# Patient Record
Sex: FEMALE | Employment: OTHER | URBAN - METROPOLITAN AREA
[De-identification: names, ages, dates, MRNs, and addresses within clinical notes are randomized per-mention and may not be internally consistent; named-entity substitution may affect disease eponyms.]

---

## 2024-05-02 ENCOUNTER — HOSPITAL ENCOUNTER (INPATIENT)
Age: 79
LOS: 6 days | Discharge: HOME | DRG: 871 | End: 2024-05-08
Attending: STUDENT IN AN ORGANIZED HEALTH CARE EDUCATION/TRAINING PROGRAM | Admitting: STUDENT IN AN ORGANIZED HEALTH CARE EDUCATION/TRAINING PROGRAM
Payer: MEDICARE

## 2024-05-02 ENCOUNTER — APPOINTMENT (OUTPATIENT)
Dept: CT IMAGING | Age: 79
DRG: 871 | End: 2024-05-02
Payer: MEDICARE

## 2024-05-02 DIAGNOSIS — I71.40 ABDOMINAL AORTIC ANEURYSM (AAA) WITHOUT RUPTURE, UNSPECIFIED PART (HCC): ICD-10-CM

## 2024-05-02 DIAGNOSIS — N17.9 AKI (ACUTE KIDNEY INJURY) (HCC): ICD-10-CM

## 2024-05-02 DIAGNOSIS — J96.01 SEPSIS WITH ACUTE HYPOXIC RESPIRATORY FAILURE WITHOUT SEPTIC SHOCK, DUE TO UNSPECIFIED ORGANISM (HCC): Primary | ICD-10-CM

## 2024-05-02 DIAGNOSIS — A41.9 SEPSIS WITH ACUTE HYPOXIC RESPIRATORY FAILURE WITHOUT SEPTIC SHOCK, DUE TO UNSPECIFIED ORGANISM (HCC): Primary | ICD-10-CM

## 2024-05-02 DIAGNOSIS — I48.0 PAROXYSMAL ATRIAL FIBRILLATION (HCC): ICD-10-CM

## 2024-05-02 DIAGNOSIS — R65.20 SEPSIS WITH ACUTE HYPOXIC RESPIRATORY FAILURE WITHOUT SEPTIC SHOCK, DUE TO UNSPECIFIED ORGANISM (HCC): Primary | ICD-10-CM

## 2024-05-02 PROBLEM — J96.00 ACUTE RESPIRATORY FAILURE (HCC): Status: ACTIVE | Noted: 2024-05-02

## 2024-05-02 LAB
ABO/RH: NORMAL
ALBUMIN SERPL-MCNC: 3.6 GM/DL (ref 3.4–5)
ALP BLD-CCNC: 105 IU/L (ref 40–128)
ALT SERPL-CCNC: 6 U/L (ref 10–40)
ANION GAP SERPL CALCULATED.3IONS-SCNC: 17 MMOL/L (ref 7–16)
ANTIBODY SCREEN: NEGATIVE
APTT: 30.8 SECONDS (ref 25.1–37.1)
AST SERPL-CCNC: 20 IU/L (ref 15–37)
B PARAP IS1001 DNA NPH QL NAA+NON-PROBE: NOT DETECTED
B PERT.PT PRMT NPH QL NAA+NON-PROBE: NOT DETECTED
BASE EXCESS MIXED: 1.9 (ref 0–3)
BASOPHILS ABSOLUTE: 0 K/CU MM
BASOPHILS RELATIVE PERCENT: 0.1 % (ref 0–1)
BILIRUB SERPL-MCNC: 0.6 MG/DL (ref 0–1)
BILIRUBIN, URINE: NEGATIVE MG/DL
BLOOD, URINE: ABNORMAL
BUN SERPL-MCNC: 49 MG/DL (ref 6–23)
C PNEUM DNA NPH QL NAA+NON-PROBE: NOT DETECTED
CALCIUM SERPL-MCNC: 8.8 MG/DL (ref 8.3–10.6)
CHLORIDE BLD-SCNC: 92 MMOL/L (ref 99–110)
CHLORIDE URINE RANDOM: 12 MMOL/L (ref 43–210)
CLARITY: ABNORMAL
CO2: 22 MMOL/L (ref 21–32)
COLOR: YELLOW
CREAT SERPL-MCNC: 3.9 MG/DL (ref 0.6–1.1)
CREATININE URINE: 135.9 MG/DL (ref 28–217)
DIFFERENTIAL TYPE: ABNORMAL
EOSINOPHILS ABSOLUTE: 0 K/CU MM
EOSINOPHILS RELATIVE PERCENT: 0 % (ref 0–3)
FLUAV H1 2009 PAN RNA NPH NAA+NON-PROBE: NOT DETECTED
FLUAV H1 RNA NPH QL NAA+NON-PROBE: NOT DETECTED
FLUAV H3 RNA NPH QL NAA+NON-PROBE: NOT DETECTED
FLUAV RNA NPH QL NAA+NON-PROBE: NOT DETECTED
FLUBV RNA NPH QL NAA+NON-PROBE: NOT DETECTED
GFR, ESTIMATED: 11 ML/MIN/1.73M2
GLUCOSE SERPL-MCNC: 96 MG/DL (ref 70–99)
GLUCOSE URINE: NEGATIVE MG/DL
HADV DNA NPH QL NAA+NON-PROBE: NOT DETECTED
HCO3 VENOUS: 29 MMOL/L (ref 22–29)
HCOV 229E RNA NPH QL NAA+NON-PROBE: NOT DETECTED
HCOV HKU1 RNA NPH QL NAA+NON-PROBE: NOT DETECTED
HCOV NL63 RNA NPH QL NAA+NON-PROBE: NOT DETECTED
HCOV OC43 RNA NPH QL NAA+NON-PROBE: NOT DETECTED
HCT VFR BLD CALC: 37.5 % (ref 37–47)
HEMOGLOBIN: 11.4 GM/DL (ref 12.5–16)
HMPV RNA NPH QL NAA+NON-PROBE: NOT DETECTED
HPIV1 RNA NPH QL NAA+NON-PROBE: NOT DETECTED
HPIV2 RNA NPH QL NAA+NON-PROBE: NOT DETECTED
HPIV3 RNA NPH QL NAA+NON-PROBE: NOT DETECTED
HPIV4 RNA NPH QL NAA+NON-PROBE: NOT DETECTED
IMMATURE NEUTROPHIL %: 0.3 % (ref 0–0.43)
INR BLD: 1.5 INDEX
KETONES, URINE: NEGATIVE MG/DL
LACTIC ACID, SEPSIS: 1.2 MMOL/L (ref 0.4–2)
LACTIC ACID, SEPSIS: 2.1 MMOL/L (ref 0.4–2)
LEUKOCYTE ESTERASE, URINE: ABNORMAL
LYMPHOCYTES ABSOLUTE: 0.4 K/CU MM
LYMPHOCYTES RELATIVE PERCENT: 2.9 % (ref 24–44)
M PNEUMO DNA NPH QL NAA+NON-PROBE: NOT DETECTED
MCH RBC QN AUTO: 26.6 PG (ref 27–31)
MCHC RBC AUTO-ENTMCNC: 30.4 % (ref 32–36)
MCV RBC AUTO: 87.6 FL (ref 78–100)
MONOCYTES ABSOLUTE: 0.7 K/CU MM
MONOCYTES RELATIVE PERCENT: 4.6 % (ref 0–4)
NEUTROPHILS ABSOLUTE: 13.6 K/CU MM
NEUTROPHILS RELATIVE PERCENT: 92.1 % (ref 36–66)
NITRITE URINE, QUANTITATIVE: NEGATIVE
NUCLEATED RBC %: 0 %
O2 SAT, VEN: 75.6 % (ref 50–70)
PCO2, VEN: 55 MMHG (ref 41–51)
PDW BLD-RTO: 14.7 % (ref 11.7–14.9)
PH VENOUS: 7.33 (ref 7.32–7.43)
PH, URINE: 5 (ref 5–8)
PLATELET # BLD: 238 K/CU MM (ref 140–440)
PMV BLD AUTO: 10.5 FL (ref 7.5–11.1)
PO2, VEN: 45 MMHG (ref 28–48)
POTASSIUM SERPL-SCNC: 4.2 MMOL/L (ref 3.5–5.1)
POTASSIUM, UR: 31.2 MMOL/L (ref 22–119)
PRO-BNP: 7496 PG/ML
PROCALCITONIN SERPL-MCNC: 14.14 NG/ML
PROT/CREAT RATIO, UR: 0.6
PROTEIN UA: 30 MG/DL
PROTHROMBIN TIME: 18.2 SECONDS (ref 11.7–14.5)
RBC # BLD: 4.28 M/CU MM (ref 4.2–5.4)
RSV RNA NPH QL NAA+NON-PROBE: NOT DETECTED
RV+EV RNA NPH QL NAA+NON-PROBE: NOT DETECTED
SARS-COV-2 RNA NPH QL NAA+NON-PROBE: NOT DETECTED
SODIUM BLD-SCNC: 131 MMOL/L (ref 135–145)
SODIUM URINE: 18 MMOL/L (ref 35–167)
SODIUM URINE: 18 MMOL/L (ref 35–167)
SPECIFIC GRAVITY UA: 1.02 (ref 1–1.03)
TOTAL CK: 210 IU/L (ref 26–140)
TOTAL IMMATURE NEUTOROPHIL: 0.05 K/CU MM
TOTAL NUCLEATED RBC: 0 K/CU MM
TOTAL PROTEIN: 7 GM/DL (ref 6.4–8.2)
TROPONIN, HIGH SENSITIVITY: 145 NG/L (ref 0–14)
URINE TOTAL PROTEIN: 86.8 MG/DL
UROBILINOGEN, URINE: 1 MG/DL (ref 0.2–1)
WBC # BLD: 14.8 K/CU MM (ref 4–10.5)

## 2024-05-02 PROCEDURE — 85610 PROTHROMBIN TIME: CPT

## 2024-05-02 PROCEDURE — 86900 BLOOD TYPING SEROLOGIC ABO: CPT

## 2024-05-02 PROCEDURE — 84145 PROCALCITONIN (PCT): CPT

## 2024-05-02 PROCEDURE — 82550 ASSAY OF CK (CPK): CPT

## 2024-05-02 PROCEDURE — 83605 ASSAY OF LACTIC ACID: CPT

## 2024-05-02 PROCEDURE — 82570 ASSAY OF URINE CREATININE: CPT

## 2024-05-02 PROCEDURE — 74174 CTA ABD&PLVS W/CONTRAST: CPT

## 2024-05-02 PROCEDURE — 86850 RBC ANTIBODY SCREEN: CPT

## 2024-05-02 PROCEDURE — 2500000003 HC RX 250 WO HCPCS: Performed by: STUDENT IN AN ORGANIZED HEALTH CARE EDUCATION/TRAINING PROGRAM

## 2024-05-02 PROCEDURE — 85730 THROMBOPLASTIN TIME PARTIAL: CPT

## 2024-05-02 PROCEDURE — 6360000002 HC RX W HCPCS: Performed by: STUDENT IN AN ORGANIZED HEALTH CARE EDUCATION/TRAINING PROGRAM

## 2024-05-02 PROCEDURE — 83880 ASSAY OF NATRIURETIC PEPTIDE: CPT

## 2024-05-02 PROCEDURE — 6370000000 HC RX 637 (ALT 250 FOR IP): Performed by: STUDENT IN AN ORGANIZED HEALTH CARE EDUCATION/TRAINING PROGRAM

## 2024-05-02 PROCEDURE — 2140000000 HC CCU INTERMEDIATE R&B

## 2024-05-02 PROCEDURE — 99285 EMERGENCY DEPT VISIT HI MDM: CPT

## 2024-05-02 PROCEDURE — 6360000004 HC RX CONTRAST MEDICATION: Performed by: STUDENT IN AN ORGANIZED HEALTH CARE EDUCATION/TRAINING PROGRAM

## 2024-05-02 PROCEDURE — 94640 AIRWAY INHALATION TREATMENT: CPT

## 2024-05-02 PROCEDURE — 81001 URINALYSIS AUTO W/SCOPE: CPT

## 2024-05-02 PROCEDURE — 2700000000 HC OXYGEN THERAPY PER DAY

## 2024-05-02 PROCEDURE — 87205 SMEAR GRAM STAIN: CPT

## 2024-05-02 PROCEDURE — 84156 ASSAY OF PROTEIN URINE: CPT

## 2024-05-02 PROCEDURE — 87449 NOS EACH ORGANISM AG IA: CPT

## 2024-05-02 PROCEDURE — 85025 COMPLETE CBC W/AUTO DIFF WBC: CPT

## 2024-05-02 PROCEDURE — 86901 BLOOD TYPING SEROLOGIC RH(D): CPT

## 2024-05-02 PROCEDURE — 2580000003 HC RX 258: Performed by: STUDENT IN AN ORGANIZED HEALTH CARE EDUCATION/TRAINING PROGRAM

## 2024-05-02 PROCEDURE — 84484 ASSAY OF TROPONIN QUANT: CPT

## 2024-05-02 PROCEDURE — 96361 HYDRATE IV INFUSION ADD-ON: CPT

## 2024-05-02 PROCEDURE — 96368 THER/DIAG CONCURRENT INF: CPT

## 2024-05-02 PROCEDURE — 87185 SC STD ENZYME DETCJ PER NZM: CPT

## 2024-05-02 PROCEDURE — 87070 CULTURE OTHR SPECIMN AEROBIC: CPT

## 2024-05-02 PROCEDURE — 80053 COMPREHEN METABOLIC PANEL: CPT

## 2024-05-02 PROCEDURE — 87077 CULTURE AEROBIC IDENTIFY: CPT

## 2024-05-02 PROCEDURE — 96365 THER/PROPH/DIAG IV INF INIT: CPT

## 2024-05-02 PROCEDURE — 0202U NFCT DS 22 TRGT SARS-COV-2: CPT

## 2024-05-02 PROCEDURE — 84133 ASSAY OF URINE POTASSIUM: CPT

## 2024-05-02 PROCEDURE — 96375 TX/PRO/DX INJ NEW DRUG ADDON: CPT

## 2024-05-02 PROCEDURE — 87040 BLOOD CULTURE FOR BACTERIA: CPT

## 2024-05-02 PROCEDURE — 84300 ASSAY OF URINE SODIUM: CPT

## 2024-05-02 PROCEDURE — 87899 AGENT NOS ASSAY W/OPTIC: CPT

## 2024-05-02 PROCEDURE — 82805 BLOOD GASES W/O2 SATURATION: CPT

## 2024-05-02 PROCEDURE — 82436 ASSAY OF URINE CHLORIDE: CPT

## 2024-05-02 RX ORDER — 0.9 % SODIUM CHLORIDE 0.9 %
1000 INTRAVENOUS SOLUTION INTRAVENOUS ONCE
Status: DISCONTINUED | OUTPATIENT
Start: 2024-05-02 | End: 2024-05-02

## 2024-05-02 RX ORDER — DOXYCYCLINE HYCLATE 100 MG
100 TABLET ORAL EVERY 12 HOURS SCHEDULED
Status: COMPLETED | OUTPATIENT
Start: 2024-05-03 | End: 2024-05-06

## 2024-05-02 RX ORDER — TRAZODONE HYDROCHLORIDE 50 MG/1
50 TABLET ORAL NIGHTLY
Status: ON HOLD | COMMUNITY
End: 2024-05-03 | Stop reason: CLARIF

## 2024-05-02 RX ORDER — IPRATROPIUM BROMIDE AND ALBUTEROL SULFATE 2.5; .5 MG/3ML; MG/3ML
2 SOLUTION RESPIRATORY (INHALATION) ONCE
Status: COMPLETED | OUTPATIENT
Start: 2024-05-02 | End: 2024-05-02

## 2024-05-02 RX ORDER — IPRATROPIUM BROMIDE AND ALBUTEROL SULFATE 2.5; .5 MG/3ML; MG/3ML
1 SOLUTION RESPIRATORY (INHALATION)
Status: DISCONTINUED | OUTPATIENT
Start: 2024-05-03 | End: 2024-05-05

## 2024-05-02 RX ORDER — ACETAMINOPHEN 650 MG/1
650 SUPPOSITORY RECTAL EVERY 6 HOURS PRN
Status: DISCONTINUED | OUTPATIENT
Start: 2024-05-02 | End: 2024-05-08 | Stop reason: HOSPADM

## 2024-05-02 RX ORDER — FENTANYL CITRATE 50 UG/ML
25 INJECTION, SOLUTION INTRAMUSCULAR; INTRAVENOUS ONCE
Status: COMPLETED | OUTPATIENT
Start: 2024-05-02 | End: 2024-05-02

## 2024-05-02 RX ORDER — ACETAMINOPHEN 325 MG/1
650 TABLET ORAL EVERY 6 HOURS PRN
Status: DISCONTINUED | OUTPATIENT
Start: 2024-05-02 | End: 2024-05-08 | Stop reason: HOSPADM

## 2024-05-02 RX ORDER — 0.9 % SODIUM CHLORIDE 0.9 %
1298 INTRAVENOUS SOLUTION INTRAVENOUS ONCE
Status: COMPLETED | OUTPATIENT
Start: 2024-05-02 | End: 2024-05-02

## 2024-05-02 RX ORDER — 0.9 % SODIUM CHLORIDE 0.9 %
1000 INTRAVENOUS SOLUTION INTRAVENOUS ONCE
Status: COMPLETED | OUTPATIENT
Start: 2024-05-02 | End: 2024-05-02

## 2024-05-02 RX ADMIN — SODIUM CHLORIDE 1000 ML: 9 INJECTION, SOLUTION INTRAVENOUS at 18:52

## 2024-05-02 RX ADMIN — PREDNISONE 50 MG: 20 TABLET ORAL at 20:35

## 2024-05-02 RX ADMIN — PIPERACILLIN AND TAZOBACTAM 4500 MG: 4; .5 INJECTION, POWDER, FOR SOLUTION INTRAVENOUS at 20:29

## 2024-05-02 RX ADMIN — IOPAMIDOL 75 ML: 755 INJECTION, SOLUTION INTRAVENOUS at 19:20

## 2024-05-02 RX ADMIN — FENTANYL CITRATE 25 MCG: 50 INJECTION, SOLUTION INTRAMUSCULAR; INTRAVENOUS at 21:14

## 2024-05-02 RX ADMIN — SODIUM CHLORIDE 1298 ML: 9 INJECTION, SOLUTION INTRAVENOUS at 20:19

## 2024-05-02 RX ADMIN — DOXYCYCLINE 100 MG: 100 INJECTION, POWDER, LYOPHILIZED, FOR SOLUTION INTRAVENOUS at 20:21

## 2024-05-02 RX ADMIN — IPRATROPIUM BROMIDE AND ALBUTEROL SULFATE 2 DOSE: .5; 3 SOLUTION RESPIRATORY (INHALATION) at 19:33

## 2024-05-02 ASSESSMENT — LIFESTYLE VARIABLES
HOW MANY STANDARD DRINKS CONTAINING ALCOHOL DO YOU HAVE ON A TYPICAL DAY: PATIENT DOES NOT DRINK
HOW OFTEN DO YOU HAVE A DRINK CONTAINING ALCOHOL: NEVER

## 2024-05-02 ASSESSMENT — PAIN SCALES - GENERAL: PAINLEVEL_OUTOF10: 9

## 2024-05-02 NOTE — ED PROVIDER NOTES
Emergency Department Encounter        Pt Name: Mamta Cobian  MRN: [id number]  Birthdate 1945  Date of evaluation: 5/2/2024  ED Physician: Carlos Ford MD    CHIEF COMPLAINT     Triage Chief Complaint:   Fatigue and Weight Loss (Loss of appetite)      HISTORY OF PRESENT ILLNESS & REVIEW OF SYSTEMS     History obtained from the patient and staff and family member at bedside.    Mamta Cobian is a 78 y.o. female who presents to the emergency department for evaluation of fatigue.  Patient is a poor historian.  Has a history of lung cancer and is supposed to wear 2 L supplemental oxygen at night, family members that she has been wearing it almost all day.  Says that says that she is having some lung pain on the right.  Denies any known sick contacts.  Denies any fevers.  Denies any cough.  Says she is on Eliquis and compliant.  Says she has a history of AAA that was repaired in 2014 without complication.  Says she has history of lung cancer.  Denies any abdominal pain.  Denies any blood in her stool or urine or when she is brushing her teeth.  Says that she is feeling tired and overall generally weak.  Says that she has been intaking fluids but has not been eating much.  Says that she has not started any chemo or radiation or any treatment for lung cancer yet.        Patient denies any new Headache, Fever, Chills, Cough, Shortness of breath, Abdominal pain, Nausea, Vomiting, Diarrhea, Constipation, and Leg swelling.    The patient has no other acute complaints at this time.  Review of systems as above.          PAST MED/SURG/SOCIAL/FAM HISTORY & ALLERGY & MEDICATIONS   No past medical history on file.  Patient Active Problem List   Diagnosis Code    Acute respiratory failure (HCC) J96.00     No family history on file.    Current Facility-Administered Medications:     [START ON 5/3/2024] cefTRIAXone (ROCEPHIN) 2,000 mg in sodium chloride 0.9 % 50 mL IVPB (mini-bag), 2,000 mg, IntraVENous, Q24H, Vane William

## 2024-05-03 LAB
ANION GAP SERPL CALCULATED.3IONS-SCNC: 14 MMOL/L (ref 7–16)
BACTERIA: NEGATIVE /HPF
BASOPHILS ABSOLUTE: 0 K/CU MM
BASOPHILS RELATIVE PERCENT: 0.2 % (ref 0–1)
BUN SERPL-MCNC: 49 MG/DL (ref 6–23)
CALCIUM SERPL-MCNC: 7.6 MG/DL (ref 8.3–10.6)
CHLORIDE BLD-SCNC: 97 MMOL/L (ref 99–110)
CO2: 21 MMOL/L (ref 21–32)
CREAT SERPL-MCNC: 4 MG/DL (ref 0.6–1.1)
DIFFERENTIAL TYPE: ABNORMAL
EOSINOPHILS ABSOLUTE: 0 K/CU MM
EOSINOPHILS RELATIVE PERCENT: 0 % (ref 0–3)
GFR, ESTIMATED: 11 ML/MIN/1.73M2
GLUCOSE SERPL-MCNC: 118 MG/DL (ref 70–99)
GRANULAR CASTS: 2 /LPF
HCT VFR BLD CALC: 33.6 % (ref 37–47)
HEMOGLOBIN: 9.8 GM/DL (ref 12.5–16)
HYALINE CASTS: 2 /LPF
IMMATURE NEUTROPHIL %: 0.4 % (ref 0–0.43)
L PNEUMO AG UR QL IA: NEGATIVE
LYMPHOCYTES ABSOLUTE: 0.2 K/CU MM
LYMPHOCYTES RELATIVE PERCENT: 2.1 % (ref 24–44)
MCH RBC QN AUTO: 26.4 PG (ref 27–31)
MCHC RBC AUTO-ENTMCNC: 29.2 % (ref 32–36)
MCV RBC AUTO: 90.6 FL (ref 78–100)
MONOCYTES ABSOLUTE: 0.2 K/CU MM
MONOCYTES RELATIVE PERCENT: 2.2 % (ref 0–4)
MUCUS: ABNORMAL HPF
NEUTROPHILS ABSOLUTE: 9.9 K/CU MM
NEUTROPHILS RELATIVE PERCENT: 95.1 % (ref 36–66)
NUCLEATED RBC %: 0 %
PDW BLD-RTO: 14.7 % (ref 11.7–14.9)
PLATELET # BLD: 181 K/CU MM (ref 140–440)
PMV BLD AUTO: 10.1 FL (ref 7.5–11.1)
POTASSIUM SERPL-SCNC: 4.9 MMOL/L (ref 3.5–5.1)
RBC # BLD: 3.71 M/CU MM (ref 4.2–5.4)
RBC CASTS: 7 /LPF
RBC URINE: 0 /HPF (ref 0–6)
S PNEUM AG CSF QL: NORMAL
SODIUM BLD-SCNC: 132 MMOL/L (ref 135–145)
SQUAMOUS EPITHELIAL: 2 /HPF
TOTAL IMMATURE NEUTOROPHIL: 0.04 K/CU MM
TOTAL NUCLEATED RBC: 0 K/CU MM
TRICHOMONAS: ABNORMAL /HPF
WBC # BLD: 10.4 K/CU MM (ref 4–10.5)
WBC UA: 9 /HPF (ref 0–5)

## 2024-05-03 PROCEDURE — 6370000000 HC RX 637 (ALT 250 FOR IP): Performed by: FAMILY MEDICINE

## 2024-05-03 PROCEDURE — 2700000000 HC OXYGEN THERAPY PER DAY

## 2024-05-03 PROCEDURE — 6370000000 HC RX 637 (ALT 250 FOR IP): Performed by: STUDENT IN AN ORGANIZED HEALTH CARE EDUCATION/TRAINING PROGRAM

## 2024-05-03 PROCEDURE — 94640 AIRWAY INHALATION TREATMENT: CPT

## 2024-05-03 PROCEDURE — 6360000002 HC RX W HCPCS: Performed by: STUDENT IN AN ORGANIZED HEALTH CARE EDUCATION/TRAINING PROGRAM

## 2024-05-03 PROCEDURE — 97535 SELF CARE MNGMENT TRAINING: CPT

## 2024-05-03 PROCEDURE — 36415 COLL VENOUS BLD VENIPUNCTURE: CPT

## 2024-05-03 PROCEDURE — 85025 COMPLETE CBC W/AUTO DIFF WBC: CPT

## 2024-05-03 PROCEDURE — 99222 1ST HOSP IP/OBS MODERATE 55: CPT | Performed by: THORACIC SURGERY (CARDIOTHORACIC VASCULAR SURGERY)

## 2024-05-03 PROCEDURE — 83516 IMMUNOASSAY NONANTIBODY: CPT

## 2024-05-03 PROCEDURE — 2140000000 HC CCU INTERMEDIATE R&B

## 2024-05-03 PROCEDURE — 97116 GAIT TRAINING THERAPY: CPT

## 2024-05-03 PROCEDURE — 94761 N-INVAS EAR/PLS OXIMETRY MLT: CPT

## 2024-05-03 PROCEDURE — 97162 PT EVAL MOD COMPLEX 30 MIN: CPT

## 2024-05-03 PROCEDURE — 80048 BASIC METABOLIC PNL TOTAL CA: CPT

## 2024-05-03 PROCEDURE — 97166 OT EVAL MOD COMPLEX 45 MIN: CPT

## 2024-05-03 PROCEDURE — 2580000003 HC RX 258: Performed by: STUDENT IN AN ORGANIZED HEALTH CARE EDUCATION/TRAINING PROGRAM

## 2024-05-03 PROCEDURE — 86160 COMPLEMENT ANTIGEN: CPT

## 2024-05-03 RX ORDER — TRAZODONE HYDROCHLORIDE 50 MG/1
150 TABLET ORAL NIGHTLY PRN
Status: DISCONTINUED | OUTPATIENT
Start: 2024-05-03 | End: 2024-05-08 | Stop reason: HOSPADM

## 2024-05-03 RX ORDER — TRAZODONE HYDROCHLORIDE 150 MG/1
150 TABLET ORAL NIGHTLY
COMMUNITY

## 2024-05-03 RX ORDER — ASPIRIN 81 MG/1
81 TABLET, CHEWABLE ORAL DAILY
Status: DISCONTINUED | OUTPATIENT
Start: 2024-05-03 | End: 2024-05-08 | Stop reason: HOSPADM

## 2024-05-03 RX ORDER — FLUTICASONE FUROATE, UMECLIDINIUM BROMIDE AND VILANTEROL TRIFENATATE 100; 62.5; 25 UG/1; UG/1; UG/1
1 POWDER RESPIRATORY (INHALATION) DAILY
COMMUNITY

## 2024-05-03 RX ORDER — ALBUTEROL SULFATE 90 UG/1
2 AEROSOL, METERED RESPIRATORY (INHALATION) EVERY 4 HOURS PRN
COMMUNITY

## 2024-05-03 RX ORDER — CITALOPRAM 20 MG/1
20 TABLET ORAL DAILY
Status: DISCONTINUED | OUTPATIENT
Start: 2024-05-03 | End: 2024-05-03

## 2024-05-03 RX ORDER — PREDNISONE 20 MG/1
40 TABLET ORAL DAILY
Status: COMPLETED | OUTPATIENT
Start: 2024-05-03 | End: 2024-05-07

## 2024-05-03 RX ORDER — ROSUVASTATIN CALCIUM 20 MG/1
20 TABLET, COATED ORAL NIGHTLY
Status: DISCONTINUED | OUTPATIENT
Start: 2024-05-03 | End: 2024-05-08 | Stop reason: HOSPADM

## 2024-05-03 RX ORDER — LISINOPRIL 20 MG/1
20 TABLET ORAL DAILY
Status: DISCONTINUED | OUTPATIENT
Start: 2024-05-03 | End: 2024-05-04

## 2024-05-03 RX ORDER — PANTOPRAZOLE SODIUM 40 MG/1
40 TABLET, DELAYED RELEASE ORAL DAILY
COMMUNITY

## 2024-05-03 RX ORDER — ROSUVASTATIN CALCIUM 20 MG/1
20 TABLET, COATED ORAL DAILY
COMMUNITY

## 2024-05-03 RX ORDER — OXYCODONE HYDROCHLORIDE 5 MG/1
5 TABLET ORAL EVERY 6 HOURS PRN
Status: DISCONTINUED | OUTPATIENT
Start: 2024-05-03 | End: 2024-05-03

## 2024-05-03 RX ORDER — PANTOPRAZOLE SODIUM 40 MG/1
40 TABLET, DELAYED RELEASE ORAL DAILY
Status: DISCONTINUED | OUTPATIENT
Start: 2024-05-03 | End: 2024-05-08 | Stop reason: HOSPADM

## 2024-05-03 RX ORDER — TIZANIDINE 4 MG/1
4 TABLET ORAL AS NEEDED
COMMUNITY

## 2024-05-03 RX ORDER — OXYCODONE HYDROCHLORIDE 5 MG/1
5 TABLET ORAL EVERY 4 HOURS PRN
Status: DISCONTINUED | OUTPATIENT
Start: 2024-05-03 | End: 2024-05-05

## 2024-05-03 RX ORDER — ROSUVASTATIN CALCIUM 20 MG/1
20 TABLET, COATED ORAL DAILY
Status: DISCONTINUED | OUTPATIENT
Start: 2024-05-03 | End: 2024-05-03

## 2024-05-03 RX ORDER — IPRATROPIUM BROMIDE AND ALBUTEROL SULFATE 2.5; .5 MG/3ML; MG/3ML
3 SOLUTION RESPIRATORY (INHALATION) EVERY 4 HOURS
COMMUNITY

## 2024-05-03 RX ORDER — OXYCODONE HYDROCHLORIDE 5 MG/1
5 TABLET ORAL EVERY 4 HOURS PRN
COMMUNITY

## 2024-05-03 RX ORDER — LISINOPRIL 20 MG/1
20 TABLET ORAL DAILY
Status: ON HOLD | COMMUNITY
End: 2024-05-07 | Stop reason: HOSPADM

## 2024-05-03 RX ORDER — CITALOPRAM 20 MG/1
20 TABLET ORAL NIGHTLY
Status: DISCONTINUED | OUTPATIENT
Start: 2024-05-03 | End: 2024-05-08 | Stop reason: HOSPADM

## 2024-05-03 RX ORDER — ASPIRIN 81 MG/1
81 TABLET, CHEWABLE ORAL DAILY
COMMUNITY

## 2024-05-03 RX ORDER — TIZANIDINE 2 MG/1
4 TABLET ORAL
Status: DISCONTINUED | OUTPATIENT
Start: 2024-05-03 | End: 2024-05-08 | Stop reason: HOSPADM

## 2024-05-03 RX ORDER — CITALOPRAM 20 MG/1
20 TABLET ORAL DAILY
COMMUNITY

## 2024-05-03 RX ADMIN — OXYCODONE HYDROCHLORIDE 5 MG: 5 TABLET ORAL at 05:45

## 2024-05-03 RX ADMIN — PREDNISONE 40 MG: 20 TABLET ORAL at 09:23

## 2024-05-03 RX ADMIN — CEFTRIAXONE SODIUM 2000 MG: 2 INJECTION, POWDER, FOR SOLUTION INTRAMUSCULAR; INTRAVENOUS at 10:15

## 2024-05-03 RX ADMIN — METOPROLOL TARTRATE 25 MG: 25 TABLET, FILM COATED ORAL at 20:52

## 2024-05-03 RX ADMIN — APIXABAN 5 MG: 5 TABLET, FILM COATED ORAL at 05:46

## 2024-05-03 RX ADMIN — OXYCODONE HYDROCHLORIDE 5 MG: 5 TABLET ORAL at 18:15

## 2024-05-03 RX ADMIN — DOXYCYCLINE HYCLATE 100 MG: 100 TABLET, COATED ORAL at 09:29

## 2024-05-03 RX ADMIN — OXYCODONE HYDROCHLORIDE 5 MG: 5 TABLET ORAL at 09:23

## 2024-05-03 RX ADMIN — OXYCODONE HYDROCHLORIDE 5 MG: 5 TABLET ORAL at 00:59

## 2024-05-03 RX ADMIN — IPRATROPIUM BROMIDE AND ALBUTEROL SULFATE 1 DOSE: 2.5; .5 SOLUTION RESPIRATORY (INHALATION) at 11:09

## 2024-05-03 RX ADMIN — METOPROLOL TARTRATE 25 MG: 25 TABLET, FILM COATED ORAL at 09:23

## 2024-05-03 RX ADMIN — ROSUVASTATIN CALCIUM 20 MG: 20 TABLET, COATED ORAL at 20:53

## 2024-05-03 RX ADMIN — IPRATROPIUM BROMIDE AND ALBUTEROL SULFATE 1 DOSE: 2.5; .5 SOLUTION RESPIRATORY (INHALATION) at 14:23

## 2024-05-03 RX ADMIN — CITALOPRAM HYDROBROMIDE 20 MG: 20 TABLET ORAL at 01:00

## 2024-05-03 RX ADMIN — ASPIRIN 81 MG: 81 TABLET, CHEWABLE ORAL at 09:23

## 2024-05-03 RX ADMIN — APIXABAN 5 MG: 5 TABLET, FILM COATED ORAL at 18:15

## 2024-05-03 RX ADMIN — IPRATROPIUM BROMIDE AND ALBUTEROL SULFATE 1 DOSE: 2.5; .5 SOLUTION RESPIRATORY (INHALATION) at 20:15

## 2024-05-03 RX ADMIN — PANTOPRAZOLE SODIUM 40 MG: 40 TABLET, DELAYED RELEASE ORAL at 05:46

## 2024-05-03 RX ADMIN — CITALOPRAM HYDROBROMIDE 20 MG: 20 TABLET ORAL at 20:52

## 2024-05-03 RX ADMIN — IPRATROPIUM BROMIDE AND ALBUTEROL SULFATE 1 DOSE: 2.5; .5 SOLUTION RESPIRATORY (INHALATION) at 07:30

## 2024-05-03 RX ADMIN — ROSUVASTATIN CALCIUM 20 MG: 20 TABLET, COATED ORAL at 01:00

## 2024-05-03 RX ADMIN — DOXYCYCLINE HYCLATE 100 MG: 100 TABLET, COATED ORAL at 20:53

## 2024-05-03 ASSESSMENT — PAIN DESCRIPTION - DIRECTION
RADIATING_TOWARDS: LOCALIZED
RADIATING_TOWARDS: LOCALIZED

## 2024-05-03 ASSESSMENT — PAIN DESCRIPTION - DESCRIPTORS
DESCRIPTORS: ACHING;DISCOMFORT;SORE
DESCRIPTORS: ACHING
DESCRIPTORS: ACHING;JABBING
DESCRIPTORS: DISCOMFORT;SORE;ACHING
DESCRIPTORS: ACHING

## 2024-05-03 ASSESSMENT — PAIN DESCRIPTION - PAIN TYPE
TYPE: CHRONIC PAIN

## 2024-05-03 ASSESSMENT — PAIN SCALES - GENERAL
PAINLEVEL_OUTOF10: 10
PAINLEVEL_OUTOF10: 10
PAINLEVEL_OUTOF10: 9
PAINLEVEL_OUTOF10: 8
PAINLEVEL_OUTOF10: 9

## 2024-05-03 ASSESSMENT — PAIN DESCRIPTION - ORIENTATION
ORIENTATION: LOWER

## 2024-05-03 ASSESSMENT — PAIN DESCRIPTION - ONSET
ONSET: ON-GOING
ONSET: ON-GOING

## 2024-05-03 ASSESSMENT — PAIN - FUNCTIONAL ASSESSMENT
PAIN_FUNCTIONAL_ASSESSMENT: ACTIVITIES ARE NOT PREVENTED
PAIN_FUNCTIONAL_ASSESSMENT: PREVENTS OR INTERFERES SOME ACTIVE ACTIVITIES AND ADLS

## 2024-05-03 ASSESSMENT — PAIN DESCRIPTION - FREQUENCY
FREQUENCY: INTERMITTENT
FREQUENCY: INTERMITTENT

## 2024-05-03 ASSESSMENT — PAIN DESCRIPTION - LOCATION
LOCATION: BACK

## 2024-05-03 NOTE — ED NOTES
ED TO INPATIENT SBAR HANDOFF    Patient Name: Mamta Cobian   :  1945  78 y.o.   Preferred Name    Family/Caregiver Present no   Restraints no   C-SSRS: Risk of Suicide: No Risk  Sitter no   Sepsis Risk Score Sepsis Risk Score: 1.02      Situation  Chief Complaint   Patient presents with    Fatigue    Weight Loss     Loss of appetite     Brief Description of Patient's Condition: pt arrives to ED w/ c/o fatigue and wt loss. Pt has dx of lung cancer. CT revealed pneumonia and pt has increased respiratory issues. Pt recently moved in w/ daughter to assist w/ caring for pt. Pt has been hypotensive despite fluid boluses. Pt normally wears 2LNC @ HS, but requires 4LNC to maintain spO2 >90%. Pt is pleasant and cooperative but sometimes confused. Continually removes purewick/leads despite pt education. Pt \"forgets\" that leads are needed for monitoring but easily redirected to replace them. Pt has no c/o att. Daughter would like updates on Plan of Care/admitting status  Mental Status: oriented, alert, coherent, and able to concentrate and follow conversation  Arrived from: home    Imaging:   CTA CHEST ABDOMEN PELVIS W WO CONTRAST   Final Result      CHEST:      1. Extensive multifocal airspace disease in the lungs most pronounced in the right upper lobe and right lower lobe with bronchial wall thickening and mucous plugging in the airways. Findings are most consistent with multifocal pneumonia.   2. Anterior right upper lobe 2 cm pulmonary nodule suspicious for lung cancer. Follow-up chest CT after acute phase in one month is recommended. Alternatively further evaluation could be performed with bronchoscopy or PET/CT.   3. Right hilar lymphadenopathy which may be reactive. Metastatic lymphadenopathy is not excluded.   4. Large penetrating ulcer or pseudoaneurysm along the lateral aspect of the aortic arch.   5. Descending thoracic aortic aneurysm status post endograft repair. Excluded aneurysm measures 5.6 cm.   6.

## 2024-05-03 NOTE — CARE COORDINATION
05/03/24 1243   Service Assessment   Patient Orientation Alert and Oriented   Cognition Alert   History Provided By Patient   Primary Caregiver Self   Support Systems Children;Family Members   Patient's Healthcare Decision Maker is: Legal Next of Kin   PCP Verified by CM Yes   Prior Functional Level Assistance with the following:;Mobility   Current Functional Level Assistance with the following:;Mobility   Can patient return to prior living arrangement Yes   Ability to make needs known: Good   Family able to assist with home care needs: Yes   Would you like for me to discuss the discharge plan with any other family members/significant others, and if so, who? No   Financial Resources Medicare   Community Resources None     CM in to see Pt to initiate discharge planning. Pt from home with family.  Pt son present. Pt states she has all needed DME.  Pt denies any needs at this time.  CM following

## 2024-05-03 NOTE — H&P
Electronically signed by Nikhil Tate MD        Electronically signed by Vane William MD on 5/3/2024 at 3:12 AM

## 2024-05-03 NOTE — ED NOTES
Pt's sister is at bedside but has to leave. Requests phone call to know if placement will be here in [facility] and what room, or if pt will be transferred to another hospital.

## 2024-05-04 LAB
ALBUMIN SERPL-MCNC: 3.2 GM/DL (ref 3.4–5)
ALP BLD-CCNC: 105 IU/L (ref 40–128)
ALT SERPL-CCNC: 7 U/L (ref 10–40)
ANION GAP SERPL CALCULATED.3IONS-SCNC: 17 MMOL/L (ref 7–16)
AST SERPL-CCNC: 30 IU/L (ref 15–37)
BASOPHILS ABSOLUTE: 0 K/CU MM
BASOPHILS RELATIVE PERCENT: 0.2 % (ref 0–1)
BILIRUB SERPL-MCNC: 0.2 MG/DL (ref 0–1)
BUN SERPL-MCNC: 55 MG/DL (ref 6–23)
CALCIUM SERPL-MCNC: 8.1 MG/DL (ref 8.3–10.6)
CHLORIDE BLD-SCNC: 100 MMOL/L (ref 99–110)
CO2: 18 MMOL/L (ref 21–32)
CREAT SERPL-MCNC: 3.9 MG/DL (ref 0.6–1.1)
CULTURE: ABNORMAL
CULTURE: ABNORMAL
DIFFERENTIAL TYPE: ABNORMAL
EOSINOPHILS ABSOLUTE: 0 K/CU MM
EOSINOPHILS RELATIVE PERCENT: 0 % (ref 0–3)
GFR, ESTIMATED: 11 ML/MIN/1.73M2
GLUCOSE SERPL-MCNC: 112 MG/DL (ref 70–99)
GRAM SMEAR: ABNORMAL
HCT VFR BLD CALC: 34.1 % (ref 37–47)
HEMOGLOBIN: 10.4 GM/DL (ref 12.5–16)
IMMATURE NEUTROPHIL %: 0.4 % (ref 0–0.43)
LYMPHOCYTES ABSOLUTE: 0.4 K/CU MM
LYMPHOCYTES RELATIVE PERCENT: 3.3 % (ref 24–44)
Lab: ABNORMAL
MAGNESIUM: 2.2 MG/DL (ref 1.8–2.4)
MCH RBC QN AUTO: 26.8 PG (ref 27–31)
MCHC RBC AUTO-ENTMCNC: 30.5 % (ref 32–36)
MCV RBC AUTO: 87.9 FL (ref 78–100)
MONOCYTES ABSOLUTE: 0.5 K/CU MM
MONOCYTES RELATIVE PERCENT: 3.8 % (ref 0–4)
NEUTROPHILS ABSOLUTE: 11 K/CU MM
NEUTROPHILS RELATIVE PERCENT: 92.3 % (ref 36–66)
NUCLEATED RBC %: 0 %
PDW BLD-RTO: 14.6 % (ref 11.7–14.9)
PHOSPHORUS: 5.1 MG/DL (ref 2.5–4.9)
PLATELET # BLD: 193 K/CU MM (ref 140–440)
PMV BLD AUTO: 9.8 FL (ref 7.5–11.1)
POTASSIUM SERPL-SCNC: 4.7 MMOL/L (ref 3.5–5.1)
RBC # BLD: 3.88 M/CU MM (ref 4.2–5.4)
SODIUM BLD-SCNC: 135 MMOL/L (ref 135–145)
SPECIMEN: ABNORMAL
TOTAL IMMATURE NEUTOROPHIL: 0.05 K/CU MM
TOTAL NUCLEATED RBC: 0 K/CU MM
TOTAL PROTEIN: 5.7 GM/DL (ref 6.4–8.2)
WBC # BLD: 11.9 K/CU MM (ref 4–10.5)

## 2024-05-04 PROCEDURE — 94640 AIRWAY INHALATION TREATMENT: CPT

## 2024-05-04 PROCEDURE — 6360000002 HC RX W HCPCS: Performed by: STUDENT IN AN ORGANIZED HEALTH CARE EDUCATION/TRAINING PROGRAM

## 2024-05-04 PROCEDURE — 80053 COMPREHEN METABOLIC PANEL: CPT

## 2024-05-04 PROCEDURE — 94664 DEMO&/EVAL PT USE INHALER: CPT

## 2024-05-04 PROCEDURE — 2580000003 HC RX 258: Performed by: STUDENT IN AN ORGANIZED HEALTH CARE EDUCATION/TRAINING PROGRAM

## 2024-05-04 PROCEDURE — 36415 COLL VENOUS BLD VENIPUNCTURE: CPT

## 2024-05-04 PROCEDURE — 2700000000 HC OXYGEN THERAPY PER DAY

## 2024-05-04 PROCEDURE — 85025 COMPLETE CBC W/AUTO DIFF WBC: CPT

## 2024-05-04 PROCEDURE — 94761 N-INVAS EAR/PLS OXIMETRY MLT: CPT

## 2024-05-04 PROCEDURE — 84100 ASSAY OF PHOSPHORUS: CPT

## 2024-05-04 PROCEDURE — 2580000003 HC RX 258: Performed by: INTERNAL MEDICINE

## 2024-05-04 PROCEDURE — 2140000000 HC CCU INTERMEDIATE R&B

## 2024-05-04 PROCEDURE — 6370000000 HC RX 637 (ALT 250 FOR IP): Performed by: FAMILY MEDICINE

## 2024-05-04 PROCEDURE — 83735 ASSAY OF MAGNESIUM: CPT

## 2024-05-04 PROCEDURE — 6370000000 HC RX 637 (ALT 250 FOR IP): Performed by: STUDENT IN AN ORGANIZED HEALTH CARE EDUCATION/TRAINING PROGRAM

## 2024-05-04 RX ORDER — POLYETHYLENE GLYCOL 3350 17 G/17G
17 POWDER, FOR SOLUTION ORAL DAILY
Status: DISCONTINUED | OUTPATIENT
Start: 2024-05-05 | End: 2024-05-08 | Stop reason: HOSPADM

## 2024-05-04 RX ORDER — SENNA AND DOCUSATE SODIUM 50; 8.6 MG/1; MG/1
2 TABLET, FILM COATED ORAL DAILY PRN
Status: DISCONTINUED | OUTPATIENT
Start: 2024-05-04 | End: 2024-05-08 | Stop reason: HOSPADM

## 2024-05-04 RX ORDER — SODIUM CHLORIDE 9 MG/ML
INJECTION, SOLUTION INTRAVENOUS CONTINUOUS
Status: DISCONTINUED | OUTPATIENT
Start: 2024-05-04 | End: 2024-05-06

## 2024-05-04 RX ADMIN — ASPIRIN 81 MG: 81 TABLET, CHEWABLE ORAL at 07:54

## 2024-05-04 RX ADMIN — METOPROLOL TARTRATE 25 MG: 25 TABLET, FILM COATED ORAL at 07:54

## 2024-05-04 RX ADMIN — PREDNISONE 40 MG: 20 TABLET ORAL at 07:54

## 2024-05-04 RX ADMIN — IPRATROPIUM BROMIDE AND ALBUTEROL SULFATE 1 DOSE: 2.5; .5 SOLUTION RESPIRATORY (INHALATION) at 08:41

## 2024-05-04 RX ADMIN — DOXYCYCLINE HYCLATE 100 MG: 100 TABLET, COATED ORAL at 07:54

## 2024-05-04 RX ADMIN — APIXABAN 5 MG: 5 TABLET, FILM COATED ORAL at 04:57

## 2024-05-04 RX ADMIN — ROSUVASTATIN CALCIUM 20 MG: 20 TABLET, COATED ORAL at 21:17

## 2024-05-04 RX ADMIN — SODIUM CHLORIDE: 9 INJECTION, SOLUTION INTRAVENOUS at 11:29

## 2024-05-04 RX ADMIN — PANTOPRAZOLE SODIUM 40 MG: 40 TABLET, DELAYED RELEASE ORAL at 04:57

## 2024-05-04 RX ADMIN — IPRATROPIUM BROMIDE AND ALBUTEROL SULFATE 1 DOSE: 2.5; .5 SOLUTION RESPIRATORY (INHALATION) at 15:30

## 2024-05-04 RX ADMIN — CITALOPRAM HYDROBROMIDE 20 MG: 20 TABLET ORAL at 21:15

## 2024-05-04 RX ADMIN — OXYCODONE HYDROCHLORIDE 5 MG: 5 TABLET ORAL at 16:51

## 2024-05-04 RX ADMIN — IPRATROPIUM BROMIDE AND ALBUTEROL SULFATE 1 DOSE: 2.5; .5 SOLUTION RESPIRATORY (INHALATION) at 20:40

## 2024-05-04 RX ADMIN — OXYCODONE HYDROCHLORIDE 5 MG: 5 TABLET ORAL at 21:15

## 2024-05-04 RX ADMIN — OXYCODONE HYDROCHLORIDE 5 MG: 5 TABLET ORAL at 04:57

## 2024-05-04 RX ADMIN — IPRATROPIUM BROMIDE AND ALBUTEROL SULFATE 1 DOSE: 2.5; .5 SOLUTION RESPIRATORY (INHALATION) at 12:09

## 2024-05-04 RX ADMIN — APIXABAN 5 MG: 5 TABLET, FILM COATED ORAL at 16:51

## 2024-05-04 RX ADMIN — OXYCODONE HYDROCHLORIDE 5 MG: 5 TABLET ORAL at 00:41

## 2024-05-04 RX ADMIN — METOPROLOL TARTRATE 25 MG: 25 TABLET, FILM COATED ORAL at 21:17

## 2024-05-04 RX ADMIN — DOXYCYCLINE HYCLATE 100 MG: 100 TABLET, COATED ORAL at 21:17

## 2024-05-04 RX ADMIN — OXYCODONE HYDROCHLORIDE 5 MG: 5 TABLET ORAL at 09:29

## 2024-05-04 RX ADMIN — CEFTRIAXONE SODIUM 2000 MG: 2 INJECTION, POWDER, FOR SOLUTION INTRAMUSCULAR; INTRAVENOUS at 07:54

## 2024-05-04 ASSESSMENT — PAIN - FUNCTIONAL ASSESSMENT
PAIN_FUNCTIONAL_ASSESSMENT: ACTIVITIES ARE NOT PREVENTED

## 2024-05-04 ASSESSMENT — PAIN DESCRIPTION - DESCRIPTORS
DESCRIPTORS: ACHING

## 2024-05-04 ASSESSMENT — PAIN DESCRIPTION - PAIN TYPE: TYPE: CHRONIC PAIN

## 2024-05-04 ASSESSMENT — PAIN DESCRIPTION - LOCATION
LOCATION: BACK

## 2024-05-04 ASSESSMENT — PAIN DESCRIPTION - ORIENTATION
ORIENTATION: LOWER
ORIENTATION: MID
ORIENTATION: LOWER
ORIENTATION: LOWER

## 2024-05-04 ASSESSMENT — PAIN SCALES - GENERAL
PAINLEVEL_OUTOF10: 9
PAINLEVEL_OUTOF10: 2
PAINLEVEL_OUTOF10: 9
PAINLEVEL_OUTOF10: 9
PAINLEVEL_OUTOF10: 10
PAINLEVEL_OUTOF10: 7
PAINLEVEL_OUTOF10: 9

## 2024-05-04 NOTE — PLAN OF CARE
Problem: Discharge Planning  Goal: Discharge to home or other facility with appropriate resources  Outcome: Progressing  Flowsheets (Taken 5/3/2024 2045 by Billie Quiñones, RN)  Discharge to home or other facility with appropriate resources: Identify barriers to discharge with patient and caregiver     Problem: Pain  Goal: Verbalizes/displays adequate comfort level or baseline comfort level  Outcome: Progressing     Problem: Safety - Adult  Goal: Free from fall injury  Outcome: Progressing

## 2024-05-05 LAB
ANION GAP SERPL CALCULATED.3IONS-SCNC: 13 MMOL/L (ref 7–16)
BASOPHILS ABSOLUTE: 0 K/CU MM
BASOPHILS RELATIVE PERCENT: 0.1 % (ref 0–1)
BUN SERPL-MCNC: 52 MG/DL (ref 6–23)
C3 SERPL-MCNC: 152 MG/DL (ref 90–180)
CALCIUM SERPL-MCNC: 8.4 MG/DL (ref 8.3–10.6)
CHLORIDE BLD-SCNC: 105 MMOL/L (ref 99–110)
CO2: 22 MMOL/L (ref 21–32)
CREAT SERPL-MCNC: 3.1 MG/DL (ref 0.6–1.1)
DIFFERENTIAL TYPE: ABNORMAL
EOSINOPHILS ABSOLUTE: 0 K/CU MM
EOSINOPHILS RELATIVE PERCENT: 0 % (ref 0–3)
GFR, ESTIMATED: 15 ML/MIN/1.73M2
GLUCOSE SERPL-MCNC: 81 MG/DL (ref 70–99)
HCT VFR BLD CALC: 33 % (ref 37–47)
HEMOGLOBIN: 10.1 GM/DL (ref 12.5–16)
IMMATURE NEUTROPHIL %: 0.8 % (ref 0–0.43)
LYMPHOCYTES ABSOLUTE: 0.4 K/CU MM
LYMPHOCYTES RELATIVE PERCENT: 3.5 % (ref 24–44)
MAGNESIUM: 2.2 MG/DL (ref 1.8–2.4)
MCH RBC QN AUTO: 27.1 PG (ref 27–31)
MCHC RBC AUTO-ENTMCNC: 30.6 % (ref 32–36)
MCV RBC AUTO: 88.5 FL (ref 78–100)
MONOCYTES ABSOLUTE: 0.6 K/CU MM
MONOCYTES RELATIVE PERCENT: 5 % (ref 0–4)
NEUTROPHILS ABSOLUTE: 11.3 K/CU MM
NEUTROPHILS RELATIVE PERCENT: 90.6 % (ref 36–66)
NUCLEATED RBC %: 0 %
PDW BLD-RTO: 15 % (ref 11.7–14.9)
PHOSPHORUS: 3.7 MG/DL (ref 2.5–4.9)
PLATELET # BLD: 244 K/CU MM (ref 140–440)
PMV BLD AUTO: 9.8 FL (ref 7.5–11.1)
POTASSIUM SERPL-SCNC: 4.5 MMOL/L (ref 3.5–5.1)
RBC # BLD: 3.73 M/CU MM (ref 4.2–5.4)
SODIUM BLD-SCNC: 140 MMOL/L (ref 135–145)
TOTAL IMMATURE NEUTOROPHIL: 0.1 K/CU MM
TOTAL NUCLEATED RBC: 0 K/CU MM
WBC # BLD: 12.5 K/CU MM (ref 4–10.5)

## 2024-05-05 PROCEDURE — 36415 COLL VENOUS BLD VENIPUNCTURE: CPT

## 2024-05-05 PROCEDURE — 80048 BASIC METABOLIC PNL TOTAL CA: CPT

## 2024-05-05 PROCEDURE — 6370000000 HC RX 637 (ALT 250 FOR IP): Performed by: FAMILY MEDICINE

## 2024-05-05 PROCEDURE — 94640 AIRWAY INHALATION TREATMENT: CPT

## 2024-05-05 PROCEDURE — 93005 ELECTROCARDIOGRAM TRACING: CPT | Performed by: INTERNAL MEDICINE

## 2024-05-05 PROCEDURE — 6370000000 HC RX 637 (ALT 250 FOR IP): Performed by: STUDENT IN AN ORGANIZED HEALTH CARE EDUCATION/TRAINING PROGRAM

## 2024-05-05 PROCEDURE — 2700000000 HC OXYGEN THERAPY PER DAY

## 2024-05-05 PROCEDURE — 85025 COMPLETE CBC W/AUTO DIFF WBC: CPT

## 2024-05-05 PROCEDURE — 83735 ASSAY OF MAGNESIUM: CPT

## 2024-05-05 PROCEDURE — 6360000002 HC RX W HCPCS: Performed by: STUDENT IN AN ORGANIZED HEALTH CARE EDUCATION/TRAINING PROGRAM

## 2024-05-05 PROCEDURE — 2140000000 HC CCU INTERMEDIATE R&B

## 2024-05-05 PROCEDURE — 2500000003 HC RX 250 WO HCPCS: Performed by: FAMILY MEDICINE

## 2024-05-05 PROCEDURE — 94761 N-INVAS EAR/PLS OXIMETRY MLT: CPT

## 2024-05-05 PROCEDURE — 2580000003 HC RX 258: Performed by: INTERNAL MEDICINE

## 2024-05-05 PROCEDURE — 6370000000 HC RX 637 (ALT 250 FOR IP): Performed by: INTERNAL MEDICINE

## 2024-05-05 PROCEDURE — 2580000003 HC RX 258: Performed by: STUDENT IN AN ORGANIZED HEALTH CARE EDUCATION/TRAINING PROGRAM

## 2024-05-05 PROCEDURE — 84100 ASSAY OF PHOSPHORUS: CPT

## 2024-05-05 PROCEDURE — 2580000003 HC RX 258: Performed by: FAMILY MEDICINE

## 2024-05-05 RX ORDER — OXYCODONE HYDROCHLORIDE 5 MG/1
5 TABLET ORAL ONCE
Status: COMPLETED | OUTPATIENT
Start: 2024-05-05 | End: 2024-05-06

## 2024-05-05 RX ORDER — ALBUTEROL SULFATE 90 UG/1
2 AEROSOL, METERED RESPIRATORY (INHALATION)
Status: DISCONTINUED | OUTPATIENT
Start: 2024-05-05 | End: 2024-05-05

## 2024-05-05 RX ORDER — DILTIAZEM HYDROCHLORIDE 5 MG/ML
10 INJECTION INTRAVENOUS ONCE
Status: COMPLETED | OUTPATIENT
Start: 2024-05-05 | End: 2024-05-05

## 2024-05-05 RX ORDER — METOPROLOL TARTRATE 1 MG/ML
5 INJECTION, SOLUTION INTRAVENOUS ONCE
Status: COMPLETED | OUTPATIENT
Start: 2024-05-05 | End: 2024-05-05

## 2024-05-05 RX ORDER — ALBUTEROL SULFATE 90 UG/1
2 AEROSOL, METERED RESPIRATORY (INHALATION)
Status: DISCONTINUED | OUTPATIENT
Start: 2024-05-06 | End: 2024-05-08 | Stop reason: HOSPADM

## 2024-05-05 RX ORDER — IPRATROPIUM BROMIDE AND ALBUTEROL SULFATE 2.5; .5 MG/3ML; MG/3ML
1 SOLUTION RESPIRATORY (INHALATION) EVERY 6 HOURS PRN
Status: DISCONTINUED | OUTPATIENT
Start: 2024-05-05 | End: 2024-05-08 | Stop reason: HOSPADM

## 2024-05-05 RX ORDER — OXYCODONE HYDROCHLORIDE 5 MG/1
5 TABLET ORAL EVERY 4 HOURS PRN
Status: DISCONTINUED | OUTPATIENT
Start: 2024-05-05 | End: 2024-05-08 | Stop reason: HOSPADM

## 2024-05-05 RX ORDER — OXYCODONE HYDROCHLORIDE 10 MG/1
10 TABLET ORAL EVERY 4 HOURS PRN
Status: DISCONTINUED | OUTPATIENT
Start: 2024-05-05 | End: 2024-05-08 | Stop reason: HOSPADM

## 2024-05-05 RX ADMIN — DILTIAZEM HYDROCHLORIDE 10 MG: 5 INJECTION, SOLUTION INTRAVENOUS at 07:02

## 2024-05-05 RX ADMIN — DOXYCYCLINE HYCLATE 100 MG: 100 TABLET, COATED ORAL at 21:33

## 2024-05-05 RX ADMIN — SODIUM CHLORIDE 5 MG/HR: 900 INJECTION, SOLUTION INTRAVENOUS at 07:08

## 2024-05-05 RX ADMIN — ALBUTEROL SULFATE 2 PUFF: 90 AEROSOL, METERED RESPIRATORY (INHALATION) at 20:51

## 2024-05-05 RX ADMIN — DILTIAZEM HYDROCHLORIDE 30 MG: 30 TABLET, FILM COATED ORAL at 12:42

## 2024-05-05 RX ADMIN — OXYCODONE HYDROCHLORIDE 5 MG: 5 TABLET ORAL at 10:42

## 2024-05-05 RX ADMIN — CEFTRIAXONE SODIUM 2000 MG: 2 INJECTION, POWDER, FOR SOLUTION INTRAMUSCULAR; INTRAVENOUS at 08:02

## 2024-05-05 RX ADMIN — TRAZODONE HYDROCHLORIDE 150 MG: 50 TABLET ORAL at 21:34

## 2024-05-05 RX ADMIN — IPRATROPIUM BROMIDE AND ALBUTEROL SULFATE 1 DOSE: 2.5; .5 SOLUTION RESPIRATORY (INHALATION) at 08:06

## 2024-05-05 RX ADMIN — SODIUM CHLORIDE: 9 INJECTION, SOLUTION INTRAVENOUS at 08:00

## 2024-05-05 RX ADMIN — PREDNISONE 40 MG: 20 TABLET ORAL at 08:03

## 2024-05-05 RX ADMIN — APIXABAN 5 MG: 5 TABLET, FILM COATED ORAL at 05:40

## 2024-05-05 RX ADMIN — APIXABAN 5 MG: 5 TABLET, FILM COATED ORAL at 17:35

## 2024-05-05 RX ADMIN — METOPROLOL TARTRATE 25 MG: 25 TABLET, FILM COATED ORAL at 08:03

## 2024-05-05 RX ADMIN — DOXYCYCLINE HYCLATE 100 MG: 100 TABLET, COATED ORAL at 08:02

## 2024-05-05 RX ADMIN — PANTOPRAZOLE SODIUM 40 MG: 40 TABLET, DELAYED RELEASE ORAL at 05:40

## 2024-05-05 RX ADMIN — IPRATROPIUM BROMIDE AND ALBUTEROL SULFATE 1 DOSE: .5; 2.5 SOLUTION RESPIRATORY (INHALATION) at 06:12

## 2024-05-05 RX ADMIN — METOPROLOL TARTRATE 25 MG: 25 TABLET, FILM COATED ORAL at 21:34

## 2024-05-05 RX ADMIN — ALBUTEROL SULFATE 2 PUFF: 90 AEROSOL, METERED RESPIRATORY (INHALATION) at 15:24

## 2024-05-05 RX ADMIN — OXYCODONE HYDROCHLORIDE 5 MG: 5 TABLET ORAL at 14:42

## 2024-05-05 RX ADMIN — ASPIRIN 81 MG: 81 TABLET, CHEWABLE ORAL at 08:03

## 2024-05-05 RX ADMIN — OXYCODONE HYDROCHLORIDE 5 MG: 5 TABLET ORAL at 01:03

## 2024-05-05 RX ADMIN — ACETAMINOPHEN 650 MG: 325 TABLET ORAL at 08:06

## 2024-05-05 RX ADMIN — IPRATROPIUM BROMIDE 2 PUFF: 17 AEROSOL, METERED RESPIRATORY (INHALATION) at 15:26

## 2024-05-05 RX ADMIN — OXYCODONE HYDROCHLORIDE 5 MG: 5 TABLET ORAL at 19:41

## 2024-05-05 RX ADMIN — CITALOPRAM HYDROBROMIDE 20 MG: 20 TABLET ORAL at 21:34

## 2024-05-05 RX ADMIN — IPRATROPIUM BROMIDE 2 PUFF: 17 AEROSOL, METERED RESPIRATORY (INHALATION) at 20:52

## 2024-05-05 RX ADMIN — TIZANIDINE 4 MG: 2 TABLET ORAL at 21:33

## 2024-05-05 RX ADMIN — POLYETHYLENE GLYCOL 3350 17 G: 17 POWDER, FOR SOLUTION ORAL at 08:03

## 2024-05-05 RX ADMIN — METOPROLOL TARTRATE 5 MG: 5 INJECTION INTRAVENOUS at 06:43

## 2024-05-05 RX ADMIN — DILTIAZEM HYDROCHLORIDE 30 MG: 30 TABLET, FILM COATED ORAL at 17:35

## 2024-05-05 RX ADMIN — IPRATROPIUM BROMIDE AND ALBUTEROL SULFATE 1 DOSE: 2.5; .5 SOLUTION RESPIRATORY (INHALATION) at 11:25

## 2024-05-05 RX ADMIN — ROSUVASTATIN CALCIUM 20 MG: 20 TABLET, COATED ORAL at 21:34

## 2024-05-05 RX ADMIN — OXYCODONE HYDROCHLORIDE 5 MG: 5 TABLET ORAL at 05:40

## 2024-05-05 ASSESSMENT — PAIN DESCRIPTION - LOCATION
LOCATION: BACK

## 2024-05-05 ASSESSMENT — PAIN - FUNCTIONAL ASSESSMENT
PAIN_FUNCTIONAL_ASSESSMENT: ACTIVITIES ARE NOT PREVENTED
PAIN_FUNCTIONAL_ASSESSMENT: ACTIVITIES ARE NOT PREVENTED

## 2024-05-05 ASSESSMENT — PAIN DESCRIPTION - DESCRIPTORS
DESCRIPTORS: SHOOTING
DESCRIPTORS: SHOOTING
DESCRIPTORS: ACHING
DESCRIPTORS: ACHING
DESCRIPTORS: PATIENT UNABLE TO DESCRIBE
DESCRIPTORS: ACHING

## 2024-05-05 ASSESSMENT — PAIN SCALES - GENERAL
PAINLEVEL_OUTOF10: 10
PAINLEVEL_OUTOF10: 2
PAINLEVEL_OUTOF10: 6
PAINLEVEL_OUTOF10: 2
PAINLEVEL_OUTOF10: 10
PAINLEVEL_OUTOF10: 8
PAINLEVEL_OUTOF10: 8
PAINLEVEL_OUTOF10: 0
PAINLEVEL_OUTOF10: 6
PAINLEVEL_OUTOF10: 10
PAINLEVEL_OUTOF10: 0
PAINLEVEL_OUTOF10: 6
PAINLEVEL_OUTOF10: 1
PAINLEVEL_OUTOF10: 0
PAINLEVEL_OUTOF10: 0

## 2024-05-05 ASSESSMENT — PAIN DESCRIPTION - ORIENTATION
ORIENTATION: LOWER

## 2024-05-05 NOTE — FLOWSHEET NOTE
Physical Therapy  Attempted to see patient at 0908AM. RN states patient went into A-fib RVR this morning . Patient is currently at 133bpm for HR. Patient is not medically stable to mobilize at this time. PT to re-attempt when medically appropriate.     Nazanin Collado, PTA  10:01 AM  5/5/2024

## 2024-05-05 NOTE — CARE COORDINATION
CAROLINA received consult for ARU per therapy recs.  ROSCOEW reviewed chart and met with pt to discuss dc plans.  Pt cont to refuse ARU and SNF at this time.  Dc plan remains home with family.  Electronically signed by CAROLINA Dean on 5/5/2024 at 1:49 PM

## 2024-05-06 ENCOUNTER — APPOINTMENT (OUTPATIENT)
Dept: NON INVASIVE DIAGNOSTICS | Age: 79
DRG: 871 | End: 2024-05-06
Attending: INTERNAL MEDICINE
Payer: MEDICARE

## 2024-05-06 LAB
ANION GAP SERPL CALCULATED.3IONS-SCNC: 10 MMOL/L (ref 7–16)
BASOPHILS ABSOLUTE: 0 K/CU MM
BASOPHILS RELATIVE PERCENT: 0.1 % (ref 0–1)
BUN SERPL-MCNC: 34 MG/DL (ref 6–23)
CALCIUM SERPL-MCNC: 8.3 MG/DL (ref 8.3–10.6)
CHLORIDE BLD-SCNC: 108 MMOL/L (ref 99–110)
CO2: 23 MMOL/L (ref 21–32)
CREAT SERPL-MCNC: 1.9 MG/DL (ref 0.6–1.1)
DIFFERENTIAL TYPE: ABNORMAL
ECHO AV AREA PEAK VELOCITY: 1.3 CM2
ECHO AV AREA VTI: 1.3 CM2
ECHO AV AREA/BSA PEAK VELOCITY: 0.7 CM2/M2
ECHO AV AREA/BSA VTI: 0.7 CM2/M2
ECHO AV MEAN GRADIENT: 11 MMHG
ECHO AV MEAN VELOCITY: 1.6 M/S
ECHO AV PEAK GRADIENT: 20 MMHG
ECHO AV PEAK VELOCITY: 2.2 M/S
ECHO AV VELOCITY RATIO: 0.45
ECHO AV VTI: 50.5 CM
ECHO BSA: 1.86 M2
ECHO EST RA PRESSURE: 3 MMHG
ECHO LA AREA 4C: 29.7 CM2
ECHO LA MAJOR AXIS: 6.7 CM
ECHO LA VOL MOD A4C: 106 ML (ref 22–52)
ECHO LA VOLUME INDEX MOD A4C: 58 ML/M2 (ref 16–34)
ECHO LV E' LATERAL VELOCITY: 10 CM/S
ECHO LV E' SEPTAL VELOCITY: 5 CM/S
ECHO LV FRACTIONAL SHORTENING: 32 % (ref 28–44)
ECHO LV INTERNAL DIMENSION DIASTOLE INDEX: 2.4 CM/M2
ECHO LV INTERNAL DIMENSION DIASTOLIC: 4.4 CM (ref 3.9–5.3)
ECHO LV INTERNAL DIMENSION SYSTOLIC INDEX: 1.64 CM/M2
ECHO LV INTERNAL DIMENSION SYSTOLIC: 3 CM
ECHO LV IVSD: 1.3 CM (ref 0.6–0.9)
ECHO LV MASS 2D: 168.9 G (ref 67–162)
ECHO LV MASS INDEX 2D: 92.3 G/M2 (ref 43–95)
ECHO LV POSTERIOR WALL DIASTOLIC: 0.9 CM (ref 0.6–0.9)
ECHO LV RELATIVE WALL THICKNESS RATIO: 0.41
ECHO LVOT AREA: 3.1 CM2
ECHO LVOT AV VTI INDEX: 0.41
ECHO LVOT DIAM: 2 CM
ECHO LVOT MEAN GRADIENT: 2 MMHG
ECHO LVOT PEAK GRADIENT: 4 MMHG
ECHO LVOT PEAK VELOCITY: 1 M/S
ECHO LVOT STROKE VOLUME INDEX: 35.9 ML/M2
ECHO LVOT SV: 65.6 ML
ECHO LVOT VTI: 20.9 CM
ECHO MV A VELOCITY: 0.91 M/S
ECHO MV E DECELERATION TIME (DT): 187 MS
ECHO MV E VELOCITY: 1.49 M/S
ECHO MV E/A RATIO: 1.64
ECHO MV E/E' LATERAL: 14.9
ECHO MV E/E' RATIO (AVERAGED): 22.35
ECHO MV REGURGITANT ALIASING (NYQUIST) VELOCITY: 34 CM/S
ECHO MV REGURGITANT PEAK GRADIENT: 117 MMHG
ECHO MV REGURGITANT PEAK VELOCITY: 5.4 M/S
ECHO MV REGURGITANT RADIUS PISA: 0.9 CM
ECHO MV REGURGITANT VTIA: 201 CM
ECHO PULMONARY ARTERY END DIASTOLIC PRESSURE: 14 MMHG
ECHO PV REGURGITANT MAX VELOCITY: 1.9 M/S
ECHO TV REGURGITANT MAX VELOCITY: 2.92 M/S
ECHO TV REGURGITANT MAX VELOCITY: 2.92 M/S
ECHO TV REGURGITANT PEAK GRADIENT: 34 MMHG
EOSINOPHILS ABSOLUTE: 0 K/CU MM
EOSINOPHILS RELATIVE PERCENT: 0 % (ref 0–3)
GFR, ESTIMATED: 26 ML/MIN/1.73M2
GLUCOSE SERPL-MCNC: 86 MG/DL (ref 70–99)
HCT VFR BLD CALC: 31.9 % (ref 37–47)
HEMOGLOBIN: 9.5 GM/DL (ref 12.5–16)
IMMATURE NEUTROPHIL %: 1.4 % (ref 0–0.43)
LYMPHOCYTES ABSOLUTE: 0.6 K/CU MM
LYMPHOCYTES RELATIVE PERCENT: 6.3 % (ref 24–44)
MAGNESIUM: 2.1 MG/DL (ref 1.8–2.4)
MCH RBC QN AUTO: 27.1 PG (ref 27–31)
MCHC RBC AUTO-ENTMCNC: 29.8 % (ref 32–36)
MCV RBC AUTO: 91.1 FL (ref 78–100)
MONOCYTES ABSOLUTE: 0.7 K/CU MM
MONOCYTES RELATIVE PERCENT: 7.7 % (ref 0–4)
MYELOPEROXIDASE AB SER-ACNC: 0 AU/ML (ref 0–19)
NEUTROPHILS ABSOLUTE: 7.5 K/CU MM
NEUTROPHILS RELATIVE PERCENT: 84.5 % (ref 36–66)
NUCLEATED RBC %: 0 %
PDW BLD-RTO: 15.1 % (ref 11.7–14.9)
PHOSPHORUS: 3.4 MG/DL (ref 2.5–4.9)
PLATELET # BLD: 217 K/CU MM (ref 140–440)
PMV BLD AUTO: 9.7 FL (ref 7.5–11.1)
POTASSIUM SERPL-SCNC: 4.4 MMOL/L (ref 3.5–5.1)
PROTEINASE3 AB SER-ACNC: 1 AU/ML (ref 0–19)
RBC # BLD: 3.5 M/CU MM (ref 4.2–5.4)
SODIUM BLD-SCNC: 141 MMOL/L (ref 135–145)
TOTAL IMMATURE NEUTOROPHIL: 0.12 K/CU MM
TOTAL NUCLEATED RBC: 0 K/CU MM
TSH SERPL DL<=0.005 MIU/L-ACNC: 0.3 UIU/ML (ref 0.27–4.2)
WBC # BLD: 8.9 K/CU MM (ref 4–10.5)

## 2024-05-06 PROCEDURE — 6360000002 HC RX W HCPCS: Performed by: STUDENT IN AN ORGANIZED HEALTH CARE EDUCATION/TRAINING PROGRAM

## 2024-05-06 PROCEDURE — 94761 N-INVAS EAR/PLS OXIMETRY MLT: CPT

## 2024-05-06 PROCEDURE — 99223 1ST HOSP IP/OBS HIGH 75: CPT | Performed by: INTERNAL MEDICINE

## 2024-05-06 PROCEDURE — 6370000000 HC RX 637 (ALT 250 FOR IP): Performed by: INTERNAL MEDICINE

## 2024-05-06 PROCEDURE — 2700000000 HC OXYGEN THERAPY PER DAY

## 2024-05-06 PROCEDURE — 94640 AIRWAY INHALATION TREATMENT: CPT

## 2024-05-06 PROCEDURE — 83735 ASSAY OF MAGNESIUM: CPT

## 2024-05-06 PROCEDURE — 2580000003 HC RX 258: Performed by: STUDENT IN AN ORGANIZED HEALTH CARE EDUCATION/TRAINING PROGRAM

## 2024-05-06 PROCEDURE — 94669 MECHANICAL CHEST WALL OSCILL: CPT

## 2024-05-06 PROCEDURE — 93306 TTE W/DOPPLER COMPLETE: CPT

## 2024-05-06 PROCEDURE — 6370000000 HC RX 637 (ALT 250 FOR IP): Performed by: STUDENT IN AN ORGANIZED HEALTH CARE EDUCATION/TRAINING PROGRAM

## 2024-05-06 PROCEDURE — 93306 TTE W/DOPPLER COMPLETE: CPT | Performed by: INTERNAL MEDICINE

## 2024-05-06 PROCEDURE — 6370000000 HC RX 637 (ALT 250 FOR IP): Performed by: FAMILY MEDICINE

## 2024-05-06 PROCEDURE — 2140000000 HC CCU INTERMEDIATE R&B

## 2024-05-06 PROCEDURE — 36415 COLL VENOUS BLD VENIPUNCTURE: CPT

## 2024-05-06 PROCEDURE — 97530 THERAPEUTIC ACTIVITIES: CPT

## 2024-05-06 PROCEDURE — 84443 ASSAY THYROID STIM HORMONE: CPT

## 2024-05-06 PROCEDURE — 85025 COMPLETE CBC W/AUTO DIFF WBC: CPT

## 2024-05-06 PROCEDURE — 80048 BASIC METABOLIC PNL TOTAL CA: CPT

## 2024-05-06 PROCEDURE — 97535 SELF CARE MNGMENT TRAINING: CPT

## 2024-05-06 PROCEDURE — 84100 ASSAY OF PHOSPHORUS: CPT

## 2024-05-06 RX ORDER — GUAIFENESIN 600 MG/1
600 TABLET, EXTENDED RELEASE ORAL 2 TIMES DAILY
Status: DISCONTINUED | OUTPATIENT
Start: 2024-05-06 | End: 2024-05-08 | Stop reason: HOSPADM

## 2024-05-06 RX ADMIN — METOPROLOL TARTRATE 25 MG: 25 TABLET, FILM COATED ORAL at 08:53

## 2024-05-06 RX ADMIN — OXYCODONE HYDROCHLORIDE 10 MG: 10 TABLET ORAL at 05:42

## 2024-05-06 RX ADMIN — OXYCODONE HYDROCHLORIDE 10 MG: 10 TABLET ORAL at 17:13

## 2024-05-06 RX ADMIN — DOXYCYCLINE HYCLATE 100 MG: 100 TABLET, COATED ORAL at 08:53

## 2024-05-06 RX ADMIN — OXYCODONE HYDROCHLORIDE 10 MG: 10 TABLET ORAL at 09:56

## 2024-05-06 RX ADMIN — METOPROLOL TARTRATE 25 MG: 25 TABLET, FILM COATED ORAL at 21:09

## 2024-05-06 RX ADMIN — ROSUVASTATIN CALCIUM 20 MG: 20 TABLET, COATED ORAL at 21:09

## 2024-05-06 RX ADMIN — DILTIAZEM HYDROCHLORIDE 30 MG: 30 TABLET, FILM COATED ORAL at 11:45

## 2024-05-06 RX ADMIN — OXYCODONE HYDROCHLORIDE 10 MG: 10 TABLET ORAL at 22:27

## 2024-05-06 RX ADMIN — ASPIRIN 81 MG: 81 TABLET, CHEWABLE ORAL at 08:53

## 2024-05-06 RX ADMIN — DOXYCYCLINE HYCLATE 100 MG: 100 TABLET, COATED ORAL at 21:09

## 2024-05-06 RX ADMIN — CEFTRIAXONE SODIUM 2000 MG: 2 INJECTION, POWDER, FOR SOLUTION INTRAMUSCULAR; INTRAVENOUS at 09:01

## 2024-05-06 RX ADMIN — ALBUTEROL SULFATE 2 PUFF: 90 AEROSOL, METERED RESPIRATORY (INHALATION) at 15:16

## 2024-05-06 RX ADMIN — ALBUTEROL SULFATE 2 PUFF: 90 AEROSOL, METERED RESPIRATORY (INHALATION) at 20:18

## 2024-05-06 RX ADMIN — APIXABAN 5 MG: 5 TABLET, FILM COATED ORAL at 16:12

## 2024-05-06 RX ADMIN — POLYETHYLENE GLYCOL 3350 17 G: 17 POWDER, FOR SOLUTION ORAL at 08:52

## 2024-05-06 RX ADMIN — PANTOPRAZOLE SODIUM 40 MG: 40 TABLET, DELAYED RELEASE ORAL at 05:42

## 2024-05-06 RX ADMIN — CITALOPRAM HYDROBROMIDE 20 MG: 20 TABLET ORAL at 21:09

## 2024-05-06 RX ADMIN — OXYCODONE HYDROCHLORIDE 5 MG: 5 TABLET ORAL at 00:44

## 2024-05-06 RX ADMIN — GUAIFENESIN 600 MG: 600 TABLET, EXTENDED RELEASE ORAL at 11:44

## 2024-05-06 RX ADMIN — ALBUTEROL SULFATE 2 PUFF: 90 AEROSOL, METERED RESPIRATORY (INHALATION) at 11:17

## 2024-05-06 RX ADMIN — GUAIFENESIN 600 MG: 600 TABLET, EXTENDED RELEASE ORAL at 21:09

## 2024-05-06 RX ADMIN — APIXABAN 5 MG: 5 TABLET, FILM COATED ORAL at 05:43

## 2024-05-06 RX ADMIN — PREDNISONE 40 MG: 20 TABLET ORAL at 08:53

## 2024-05-06 ASSESSMENT — PAIN DESCRIPTION - ORIENTATION
ORIENTATION: LOWER
ORIENTATION: LOWER

## 2024-05-06 ASSESSMENT — PAIN DESCRIPTION - DESCRIPTORS
DESCRIPTORS: STABBING
DESCRIPTORS: STABBING

## 2024-05-06 ASSESSMENT — PAIN SCALES - GENERAL
PAINLEVEL_OUTOF10: 0
PAINLEVEL_OUTOF10: 10
PAINLEVEL_OUTOF10: 9
PAINLEVEL_OUTOF10: 0
PAINLEVEL_OUTOF10: 9
PAINLEVEL_OUTOF10: 9

## 2024-05-06 ASSESSMENT — PAIN - FUNCTIONAL ASSESSMENT
PAIN_FUNCTIONAL_ASSESSMENT: PREVENTS OR INTERFERES SOME ACTIVE ACTIVITIES AND ADLS
PAIN_FUNCTIONAL_ASSESSMENT: PREVENTS OR INTERFERES SOME ACTIVE ACTIVITIES AND ADLS

## 2024-05-06 ASSESSMENT — PAIN DESCRIPTION - LOCATION
LOCATION: BACK
LOCATION: BACK

## 2024-05-06 NOTE — CONSULTS
[address] CENTER [place] [id number]                              CONSULTATION      PATIENT NAME: RADHA SPARKS                : 1945  MED REC NO: [id number]                      ROOM: 3124  ACCOUNT NO: [id number]                       ADMIT DATE: 2024  PROVIDER: Kody Ortiz MD      HISTORY OF PRESENT ILLNESS:  The patient is an 78-year-old lady with multiple medical problems including COPD; recent diagnosis of squamous cell carcinoma of the lung at [facility]; coronary artery disease; chronic kidney disease; chronic respiratory failure, on home oxygen; who came to the emergency room with complaints of increasing shortness of breath and cough productive of whitish to yellow phlegm.  She denied any hemoptysis.  She denied any fever or chills.  She denied any nausea or vomiting.  She had a CAT scan of the chest, which showed right upper lobe and lower lobe pneumonia and right upper lobe mass, which was biopsied at [facility] and was squamous cell carcinoma.  She is undergoing workup for lung cancer, has not started treatment yet.    PAST MEDICAL HISTORY:  Significant for COPD; CA of the lung; coronary artery disease; chronic kidney disease; paroxysmal atrial fibrillation, on Eliquis.    PAST SURGICAL HISTORY:  Remarkable for bronchoscopy.    FAMILY HISTORY:  Noncontributory.    SOCIAL HISTORY:  Reveals that she smokes about a pack per day and has been smoking for 50 years.  No history of alcohol or drug abuse.    MEDICATIONS:  Reviewed.    ALLERGIES:  SHE IS ALLERGIC TO LATEX.      REVIEW OF SYSTEMS:  Ten to fourteen-point review of systems was reviewed and is negative except for what is mentioned in the history of present illness.    PHYSICAL EXAMINATION:  GENERAL:  The patient is alert, oriented x3, in no acute respiratory distress.   VITAL SIGNS:  Her blood pressure is 151/67 mmHg, pulse of 59 per minute, and 
    INPATIENT CARDIOLOGY CONSULT NOTE         Reason for consultation: A-fib/MR    History of present illness:Mamta is a 78 y.o.year old who is admitted with   Chief Complaint   Patient presents with    Fatigue    Weight Loss     Loss of appetite        Patient is a 78-year-old female who was admitted to the hospital with weight loss  Patient follows up with Dr. Mora cardiology at [facility].    Patient has by medical history significant for coronary disease s/p CABG 2008, history of AAA s/p EVAR 2014, A-fib on oral anticoagulation, essential hypertension, hyperlipidemia.      Recently diagnosed with squamous cell carcinoma of the lung at [facility].  Cardio consulted for evaluation of moderate to severe MR noted on echocardiogram as well as episode of paroxysmal atrial fibrillation with rapid ventricular response yesterday which since then has converted to sinus rhythm.  Patient denies any current cardiovascular symptoms        Pertinent Lab Personally Review     Recent Labs     05/06/24  0538   WBC 8.9   HGB 9.5*   HCT 31.9*         Recent Labs     05/06/24  0538      K 4.4      CO2 23   PHOS 3.4   BUN 34*   CREATININE 1.9*     Recent Labs     05/04/24  0335   AST 30   ALT 7*   BILITOT 0.2   ALKPHOS 105     Lab Results   Component Value Date    PROBNP 7,496 (H) 05/02/2024         Past medical history:    has no past medical history on file.  Past surgical history:   has no past surgical history on file.  Social History:   reports that she has been smoking cigarettes. She has never used smokeless tobacco. She reports that she does not currently use alcohol. She reports that she does not currently use drugs.  Family history:   no family history of CAD, STROKE of DM    Allergies   Allergen Reactions    Latex Rash       guaiFENesin (MUCINEX) extended release tablet 600 mg, BID  ipratropium 0.5 mg-albuterol 2.5 mg (DUONEB) nebulizer solution 1 Dose, Q6H PRN  dilTIAZem (CARDIZEM) tablet 
Dict [id number]  
[facility] ACUTE CARE PHYSICAL THERAPY EVALUATION  Mamta Cobian, 1945, 3124/3124-A, 5/3/2024    History  [place]:  The primary encounter diagnosis was Sepsis with acute hypoxic respiratory failure without septic shock, due to unspecified organism (HCC). Diagnoses of SHAUNNA (acute kidney injury) (HCC) and Abdominal aortic aneurysm (AAA) without rupture, unspecified part (HCC) were also pertinent to this visit.  Patient  has no past medical history on file.  Patient  has no past surgical history on file.    Discharge Recommendation: Encourage facility for intensive rehabilitation, anticipate 3 hours per day and 5 days per week.     Equipment: TBD at next level of care    Subjective:    Patient states:  pt agreeable to session      Pain:  denies pain.      Communication with other providers:  Handoff to RN, OT    Restrictions: general precautions, fall risk    Home Setup/Prior level of function  Social/Functional History  Lives With: Other (comment) (Sister)  Type of Home: House  Home Layout: One level  Home Access: Level entry  Home Equipment: Cane, Walker, rolling, Oxygen (PRN use of either device on \"bad days\"; 2L O2 at night)  Has the patient had two or more falls in the past year or any fall with injury in the past year?: No  ADL Assistance: Independent  Homemaking Assistance: Independent  Homemaking Responsibilities: Yes  Ambulation Assistance: Needs assistance (PRN assist from sister on \"bad days\")  Transfer Assistance: Independent    Examination of body systems (includes body structures/functions, activity/participation limitations):  Observation:  pt supine in bed upon arrival and agreeable to therapy  Vision:  WFL  Hearing:  WFL  Cardiopulmonary: 4L O2   Cognition: A&Ox4 but appears impaired, see OT/SLP note for further evaluation.    Musculoskeletal  ROM R/L:  WFL.    Strength R/L:  4/5, minimal impairment in function and endurance.    Neuro:  WFL      Mobility:  Rolling L/R:  SBA  Supine to 
Collateral flow from conus to RCA   SVG Graft to circumflex   PETER Graft to LAD   LIMA graft to the LAD is widely patent   Saphenous vein graft to the 1st OM is widely patent     Kidney imaging :     Computed tomographic angiogram of abdomen and pelvis done on May 2, 2024      Normal enhancement of the kidneys. No mass or hydronephrosis identified. No retroperitoneal lymphadenopathy.     PMH :   Probable mild chronic kidney disease perhaps stage II or III  Apparently had aortic aneurysm repair twice in the past  Coronary artery status post coronary artery bypass surgery x 2 vessel back in   Longstanding high blood pressure  History of memory loss and osteoarthritis  Dyslipidemia, probable dysrhythmia and mood disorder  Possible gastroesophageal reflux disorder        PSH :  Status post coronary artery bypass surgery x 2 vessel back in    apparently had 2 aortic aneurysm repair   she also had an exploratory laparotomy surgery back in    Hemorrhoidectomy   had bladder lift surgery      OB GYN Hx:    4, para 4, no history of eclampsia, preeclampsia, gestational diabetes or hypertension    Habits :    she still smokes for CBC today has been doing it for a long time, no history of alcohol illicit drug use    Soc Hx:   over she was born in [place] but lived and grew up in [place], she was  for 50+ years but unfortunately her   in .  She has 3 children.  Currently she was living in [place] with her son but was visiting sister here in town for 1 week.    [facility]    heart disease runs in the family        REVIEW OF SYSTEMS:     All pertinent ROS neg except    chest pain some intermittent shortness of    Medication :     Reviewed, see in epic    BP (!) 113/52   Pulse 62   Temp 97.8 °F (36.6 °C) (Oral)   Resp 17   Ht 1.651 m (5' 5\")   Wt 77 kg (169 lb 12.1 oz)   SpO2 96%   BMI 28.25 kg/m²     PHYSICAL EXAM:  General appearance:  alert, can oriented  HEENT:  2+ 
Left renal artery is intact. Renal artery demonstrates mild stenosis of the origin.     The infrarenal abdominal aorta demonstrates extensive neural thrombus with mixed attenuation. Irregular aneurysm with maximum diameter of 4.6 x 4.3 cm.     Ectatic and aneurysmal dilation of the iliac arteries. Right common iliac artery demonstrates a bilobed aneurysm with maximum diameter of 2.3 cm. Left common iliac artery measures 1.6 cm in diameter.     Distal iliac vasculature demonstrates patchy calcification without significant stenosis.     Retroperitoneum: Normal enhancement of the kidneys. No mass or hydronephrosis identified. No retroperitoneal lymphadenopathy.     Bowel and Peritoneum: Nonobstructive bowel gas pattern. No free air. No significant free fluid. Mild diverticulosis without diverticulitis. Urinary bladder is contracted.     Pelvis: No adnexal mass identified. No free fluid the pelvis. Urinary bladder is contracted. No pelvic lymphadenopathy identified.     Bones: No suspicious osseous abnormality.        IMPRESSION:     CHEST:     1. Extensive multifocal airspace disease in the lungs most pronounced in the right upper lobe and right lower lobe with bronchial wall thickening and mucous plugging in the airways. Findings are most consistent with multifocal pneumonia.  2. Anterior right upper lobe 2 cm pulmonary nodule suspicious for lung cancer. Follow-up chest CT after acute phase in one month is recommended. Alternatively further evaluation could be performed with bronchoscopy or PET/CT.  3. Right hilar lymphadenopathy which may be reactive. Metastatic lymphadenopathy is not excluded.  4. Large penetrating ulcer or pseudoaneurysm along the lateral aspect of the aortic arch.  5. Descending thoracic aortic aneurysm status post endograft repair. Excluded aneurysm measures 5.6 cm.  6. Underlying centrilobular emphysema.        ABDOMEN/PELVIS:     1. 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  2.

## 2024-05-07 PROBLEM — I71.22 ANEURYSM OF AORTIC ARCH WITHOUT RUPTURE (HCC): Status: ACTIVE | Noted: 2024-05-07

## 2024-05-07 LAB
ALBUMIN SERPL-MCNC: 3.2 GM/DL (ref 3.4–5)
ALP BLD-CCNC: 100 IU/L (ref 40–128)
ALT SERPL-CCNC: 9 U/L (ref 10–40)
ANION GAP SERPL CALCULATED.3IONS-SCNC: 12 MMOL/L (ref 7–16)
AST SERPL-CCNC: 19 IU/L (ref 15–37)
BILIRUB SERPL-MCNC: 0.2 MG/DL (ref 0–1)
BUN SERPL-MCNC: 31 MG/DL (ref 6–23)
CALCIUM SERPL-MCNC: 9 MG/DL (ref 8.3–10.6)
CHLORIDE BLD-SCNC: 106 MMOL/L (ref 99–110)
CO2: 22 MMOL/L (ref 21–32)
CREAT SERPL-MCNC: 1.7 MG/DL (ref 0.6–1.1)
CULTURE: NORMAL
CULTURE: NORMAL
GFR, ESTIMATED: 30 ML/MIN/1.73M2
GLUCOSE SERPL-MCNC: 112 MG/DL (ref 70–99)
Lab: NORMAL
Lab: NORMAL
MAGNESIUM: 2 MG/DL (ref 1.8–2.4)
PHOSPHORUS: 3.4 MG/DL (ref 2.5–4.9)
POTASSIUM SERPL-SCNC: 4.7 MMOL/L (ref 3.5–5.1)
PRO-BNP: ABNORMAL PG/ML
PROCALCITONIN SERPL-MCNC: 0.61 NG/ML
SODIUM BLD-SCNC: 140 MMOL/L (ref 135–145)
SPECIMEN: NORMAL
SPECIMEN: NORMAL
TOTAL PROTEIN: 5.6 GM/DL (ref 6.4–8.2)

## 2024-05-07 PROCEDURE — 80053 COMPREHEN METABOLIC PANEL: CPT

## 2024-05-07 PROCEDURE — 6370000000 HC RX 637 (ALT 250 FOR IP): Performed by: INTERNAL MEDICINE

## 2024-05-07 PROCEDURE — 2140000000 HC CCU INTERMEDIATE R&B

## 2024-05-07 PROCEDURE — 2700000000 HC OXYGEN THERAPY PER DAY

## 2024-05-07 PROCEDURE — 83735 ASSAY OF MAGNESIUM: CPT

## 2024-05-07 PROCEDURE — 94640 AIRWAY INHALATION TREATMENT: CPT

## 2024-05-07 PROCEDURE — 2580000003 HC RX 258: Performed by: STUDENT IN AN ORGANIZED HEALTH CARE EDUCATION/TRAINING PROGRAM

## 2024-05-07 PROCEDURE — 97116 GAIT TRAINING THERAPY: CPT

## 2024-05-07 PROCEDURE — 36415 COLL VENOUS BLD VENIPUNCTURE: CPT

## 2024-05-07 PROCEDURE — 6360000002 HC RX W HCPCS: Performed by: STUDENT IN AN ORGANIZED HEALTH CARE EDUCATION/TRAINING PROGRAM

## 2024-05-07 PROCEDURE — 94761 N-INVAS EAR/PLS OXIMETRY MLT: CPT

## 2024-05-07 PROCEDURE — 84145 PROCALCITONIN (PCT): CPT

## 2024-05-07 PROCEDURE — 6370000000 HC RX 637 (ALT 250 FOR IP): Performed by: STUDENT IN AN ORGANIZED HEALTH CARE EDUCATION/TRAINING PROGRAM

## 2024-05-07 PROCEDURE — 84100 ASSAY OF PHOSPHORUS: CPT

## 2024-05-07 PROCEDURE — 6370000000 HC RX 637 (ALT 250 FOR IP): Performed by: FAMILY MEDICINE

## 2024-05-07 PROCEDURE — 94669 MECHANICAL CHEST WALL OSCILL: CPT

## 2024-05-07 PROCEDURE — 94618 PULMONARY STRESS TESTING: CPT

## 2024-05-07 PROCEDURE — 83880 ASSAY OF NATRIURETIC PEPTIDE: CPT

## 2024-05-07 RX ORDER — ACETYLCYSTEINE 200 MG/ML
600 SOLUTION ORAL; RESPIRATORY (INHALATION)
Status: DISCONTINUED | OUTPATIENT
Start: 2024-05-07 | End: 2024-05-08 | Stop reason: HOSPADM

## 2024-05-07 RX ORDER — DILTIAZEM HYDROCHLORIDE 120 MG/1
120 CAPSULE, COATED, EXTENDED RELEASE ORAL DAILY
Qty: 30 CAPSULE | Refills: 0 | Status: SHIPPED | OUTPATIENT
Start: 2024-05-07 | End: 2024-06-06

## 2024-05-07 RX ORDER — AMOXICILLIN AND CLAVULANATE POTASSIUM 500; 125 MG/1; MG/1
1 TABLET, FILM COATED ORAL 2 TIMES DAILY
Qty: 14 TABLET | Refills: 0 | Status: SHIPPED | OUTPATIENT
Start: 2024-05-07 | End: 2024-05-14

## 2024-05-07 RX ORDER — AMOXICILLIN AND CLAVULANATE POTASSIUM 875; 125 MG/1; MG/1
1 TABLET, FILM COATED ORAL 2 TIMES DAILY
Qty: 14 TABLET | Refills: 0 | Status: SHIPPED | OUTPATIENT
Start: 2024-05-07 | End: 2024-05-07 | Stop reason: HOSPADM

## 2024-05-07 RX ORDER — GUAIFENESIN 600 MG/1
600 TABLET, EXTENDED RELEASE ORAL 2 TIMES DAILY
Qty: 28 TABLET | Refills: 0 | Status: SHIPPED | OUTPATIENT
Start: 2024-05-07 | End: 2024-05-21

## 2024-05-07 RX ADMIN — DILTIAZEM HYDROCHLORIDE 30 MG: 30 TABLET, FILM COATED ORAL at 00:49

## 2024-05-07 RX ADMIN — OXYCODONE HYDROCHLORIDE 10 MG: 10 TABLET ORAL at 14:08

## 2024-05-07 RX ADMIN — OXYCODONE HYDROCHLORIDE 10 MG: 10 TABLET ORAL at 09:32

## 2024-05-07 RX ADMIN — CEFTRIAXONE SODIUM 2000 MG: 2 INJECTION, POWDER, FOR SOLUTION INTRAMUSCULAR; INTRAVENOUS at 09:34

## 2024-05-07 RX ADMIN — METOPROLOL TARTRATE 25 MG: 25 TABLET, FILM COATED ORAL at 20:39

## 2024-05-07 RX ADMIN — DILTIAZEM HYDROCHLORIDE 30 MG: 30 TABLET, FILM COATED ORAL at 12:30

## 2024-05-07 RX ADMIN — DILTIAZEM HYDROCHLORIDE 30 MG: 30 TABLET, FILM COATED ORAL at 23:13

## 2024-05-07 RX ADMIN — OXYCODONE HYDROCHLORIDE 10 MG: 10 TABLET ORAL at 18:21

## 2024-05-07 RX ADMIN — CITALOPRAM HYDROBROMIDE 20 MG: 20 TABLET ORAL at 20:39

## 2024-05-07 RX ADMIN — ACETYLCYSTEINE 600 MG: 200 SOLUTION ORAL; RESPIRATORY (INHALATION) at 20:48

## 2024-05-07 RX ADMIN — ALBUTEROL SULFATE 2 PUFF: 90 AEROSOL, METERED RESPIRATORY (INHALATION) at 11:04

## 2024-05-07 RX ADMIN — GUAIFENESIN 600 MG: 600 TABLET, EXTENDED RELEASE ORAL at 20:39

## 2024-05-07 RX ADMIN — ALBUTEROL SULFATE 2 PUFF: 90 AEROSOL, METERED RESPIRATORY (INHALATION) at 07:30

## 2024-05-07 RX ADMIN — OXYCODONE HYDROCHLORIDE 10 MG: 10 TABLET ORAL at 02:31

## 2024-05-07 RX ADMIN — PREDNISONE 40 MG: 20 TABLET ORAL at 09:30

## 2024-05-07 RX ADMIN — ALBUTEROL SULFATE 2 PUFF: 90 AEROSOL, METERED RESPIRATORY (INHALATION) at 15:10

## 2024-05-07 RX ADMIN — TIZANIDINE 4 MG: 2 TABLET ORAL at 20:39

## 2024-05-07 RX ADMIN — ASPIRIN 81 MG: 81 TABLET, CHEWABLE ORAL at 09:30

## 2024-05-07 RX ADMIN — ROSUVASTATIN CALCIUM 20 MG: 20 TABLET, COATED ORAL at 20:39

## 2024-05-07 RX ADMIN — PANTOPRAZOLE SODIUM 40 MG: 40 TABLET, DELAYED RELEASE ORAL at 05:50

## 2024-05-07 RX ADMIN — DILTIAZEM HYDROCHLORIDE 30 MG: 30 TABLET, FILM COATED ORAL at 17:23

## 2024-05-07 RX ADMIN — TIZANIDINE 4 MG: 2 TABLET ORAL at 04:31

## 2024-05-07 RX ADMIN — METOPROLOL TARTRATE 25 MG: 25 TABLET, FILM COATED ORAL at 09:30

## 2024-05-07 RX ADMIN — APIXABAN 5 MG: 5 TABLET, FILM COATED ORAL at 17:23

## 2024-05-07 RX ADMIN — APIXABAN 5 MG: 5 TABLET, FILM COATED ORAL at 05:50

## 2024-05-07 RX ADMIN — TRAZODONE HYDROCHLORIDE 150 MG: 50 TABLET ORAL at 23:14

## 2024-05-07 RX ADMIN — OXYCODONE HYDROCHLORIDE 10 MG: 10 TABLET ORAL at 23:13

## 2024-05-07 RX ADMIN — GUAIFENESIN 600 MG: 600 TABLET, EXTENDED RELEASE ORAL at 09:30

## 2024-05-07 RX ADMIN — IPRATROPIUM BROMIDE AND ALBUTEROL SULFATE 1 DOSE: .5; 2.5 SOLUTION RESPIRATORY (INHALATION) at 20:48

## 2024-05-07 ASSESSMENT — PAIN DESCRIPTION - PAIN TYPE
TYPE: CHRONIC PAIN

## 2024-05-07 ASSESSMENT — PAIN SCALES - GENERAL
PAINLEVEL_OUTOF10: 7
PAINLEVEL_OUTOF10: 10
PAINLEVEL_OUTOF10: 4
PAINLEVEL_OUTOF10: 3
PAINLEVEL_OUTOF10: 5
PAINLEVEL_OUTOF10: 0
PAINLEVEL_OUTOF10: 5
PAINLEVEL_OUTOF10: 7
PAINLEVEL_OUTOF10: 0
PAINLEVEL_OUTOF10: 10
PAINLEVEL_OUTOF10: 10
PAINLEVEL_OUTOF10: 4
PAINLEVEL_OUTOF10: 7
PAINLEVEL_OUTOF10: 10
PAINLEVEL_OUTOF10: 9
PAINLEVEL_OUTOF10: 4

## 2024-05-07 ASSESSMENT — PAIN DESCRIPTION - ORIENTATION
ORIENTATION: MID
ORIENTATION: LOWER

## 2024-05-07 ASSESSMENT — PAIN DESCRIPTION - LOCATION
LOCATION: BACK

## 2024-05-07 ASSESSMENT — PAIN - FUNCTIONAL ASSESSMENT
PAIN_FUNCTIONAL_ASSESSMENT: PREVENTS OR INTERFERES SOME ACTIVE ACTIVITIES AND ADLS
PAIN_FUNCTIONAL_ASSESSMENT: ACTIVITIES ARE NOT PREVENTED
PAIN_FUNCTIONAL_ASSESSMENT: PREVENTS OR INTERFERES SOME ACTIVE ACTIVITIES AND ADLS
PAIN_FUNCTIONAL_ASSESSMENT: ACTIVITIES ARE NOT PREVENTED

## 2024-05-07 ASSESSMENT — PAIN DESCRIPTION - FREQUENCY
FREQUENCY: INTERMITTENT
FREQUENCY: CONTINUOUS
FREQUENCY: INTERMITTENT

## 2024-05-07 ASSESSMENT — PAIN DESCRIPTION - ONSET
ONSET: ON-GOING

## 2024-05-07 ASSESSMENT — PAIN DESCRIPTION - DESCRIPTORS
DESCRIPTORS: STABBING
DESCRIPTORS: SPASM
DESCRIPTORS: ACHING
DESCRIPTORS: ACHING
DESCRIPTORS: ACHING;SHARP
DESCRIPTORS: ACHING
DESCRIPTORS: STABBING
DESCRIPTORS: ACHING
DESCRIPTORS: ACHING

## 2024-05-07 ASSESSMENT — PAIN SCALES - WONG BAKER
WONGBAKER_NUMERICALRESPONSE: NO HURT

## 2024-05-07 NOTE — DISCHARGE INSTRUCTIONS
Internal Medicine Discharge Instruction    Discharge to:  Home  Diet: ADULT DIET; Regular; Low Sodium (2 gm)  Activity: As tolerated       Be compliant with medications  Please take medications as prescribed   Please schedule an appointment to see your PCP  Please schedule an appointment to see pulmonologist , cardiothoracic surgeon, nephrology and cardiology  Please go to lab in one week for blood work  Please use 2L/min oxygen at rest and nightly. Please use 4L/min with activity   Please get the CT chest in the next 2 weeks - it looks to have already been ordered by Michelle Strickland PA-C (she works with Dr. Garces). If you have any issues with CT scheduling, please call Dr. Garces's office or Dr. Ortiz's office        Electronically signed by Bert Alas MD on 5/8/2024 at 12:53 PM

## 2024-05-07 NOTE — PLAN OF CARE
Problem: Discharge Planning  Goal: Discharge to home or other facility with appropriate resources  5/7/2024 0739 by Christina Hartley RN  Outcome: Progressing  5/7/2024 0711 by Taurus Morris RN  Outcome: Progressing     Problem: Pain  Goal: Verbalizes/displays adequate comfort level or baseline comfort level  5/7/2024 0739 by Christina Hartley RN  Outcome: Progressing  5/7/2024 0711 by Taurus Morris RN  Outcome: Progressing     Problem: Safety - Adult  Goal: Free from fall injury  5/7/2024 0739 by Christina Hartley RN  Outcome: Progressing  5/7/2024 0711 by Taurus Morris RN  Outcome: Progressing     Problem: Respiratory - Adult  Goal: Achieves optimal ventilation and oxygenation  Outcome: Progressing     Problem: Cardiovascular - Adult  Goal: Maintains optimal cardiac output and hemodynamic stability  Outcome: Progressing     Problem: Musculoskeletal - Adult  Goal: Return mobility to safest level of function  Outcome: Progressing

## 2024-05-08 VITALS
DIASTOLIC BLOOD PRESSURE: 63 MMHG | HEART RATE: 49 BPM | RESPIRATION RATE: 12 BRPM | OXYGEN SATURATION: 93 % | BODY MASS INDEX: 29.02 KG/M2 | SYSTOLIC BLOOD PRESSURE: 129 MMHG | WEIGHT: 174.16 LBS | TEMPERATURE: 97 F | HEIGHT: 65 IN

## 2024-05-08 LAB
ANION GAP SERPL CALCULATED.3IONS-SCNC: 10 MMOL/L (ref 7–16)
BUN SERPL-MCNC: 26 MG/DL (ref 6–23)
CALCIUM SERPL-MCNC: 8.7 MG/DL (ref 8.3–10.6)
CHLORIDE BLD-SCNC: 107 MMOL/L (ref 99–110)
CO2: 25 MMOL/L (ref 21–32)
CREAT SERPL-MCNC: 1.4 MG/DL (ref 0.6–1.1)
EKG ATRIAL RATE: 136 BPM
EKG ATRIAL RATE: 68 BPM
EKG ATRIAL RATE: 78 BPM
EKG DIAGNOSIS: NORMAL
EKG P AXIS: 68 DEGREES
EKG P AXIS: 80 DEGREES
EKG P-R INTERVAL: 190 MS
EKG P-R INTERVAL: 194 MS
EKG Q-T INTERVAL: 310 MS
EKG Q-T INTERVAL: 376 MS
EKG Q-T INTERVAL: 384 MS
EKG QRS DURATION: 80 MS
EKG QRS DURATION: 94 MS
EKG QRS DURATION: 96 MS
EKG QTC CALCULATION (BAZETT): 408 MS
EKG QTC CALCULATION (BAZETT): 428 MS
EKG QTC CALCULATION (BAZETT): 454 MS
EKG R AXIS: 0 DEGREES
EKG R AXIS: 13 DEGREES
EKG R AXIS: 43 DEGREES
EKG T AXIS: 117 DEGREES
EKG T AXIS: 223 DEGREES
EKG T AXIS: 90 DEGREES
EKG VENTRICULAR RATE: 129 BPM
EKG VENTRICULAR RATE: 68 BPM
EKG VENTRICULAR RATE: 78 BPM
GFR, ESTIMATED: 38 ML/MIN/1.73M2
GLUCOSE SERPL-MCNC: 95 MG/DL (ref 70–99)
POTASSIUM SERPL-SCNC: 4.5 MMOL/L (ref 3.5–5.1)
SODIUM BLD-SCNC: 142 MMOL/L (ref 135–145)

## 2024-05-08 PROCEDURE — 97530 THERAPEUTIC ACTIVITIES: CPT

## 2024-05-08 PROCEDURE — 94150 VITAL CAPACITY TEST: CPT

## 2024-05-08 PROCEDURE — 2700000000 HC OXYGEN THERAPY PER DAY

## 2024-05-08 PROCEDURE — 2580000003 HC RX 258: Performed by: STUDENT IN AN ORGANIZED HEALTH CARE EDUCATION/TRAINING PROGRAM

## 2024-05-08 PROCEDURE — 97535 SELF CARE MNGMENT TRAINING: CPT

## 2024-05-08 PROCEDURE — 94761 N-INVAS EAR/PLS OXIMETRY MLT: CPT

## 2024-05-08 PROCEDURE — 6360000002 HC RX W HCPCS: Performed by: STUDENT IN AN ORGANIZED HEALTH CARE EDUCATION/TRAINING PROGRAM

## 2024-05-08 PROCEDURE — 36415 COLL VENOUS BLD VENIPUNCTURE: CPT

## 2024-05-08 PROCEDURE — 6370000000 HC RX 637 (ALT 250 FOR IP): Performed by: INTERNAL MEDICINE

## 2024-05-08 PROCEDURE — 94640 AIRWAY INHALATION TREATMENT: CPT

## 2024-05-08 PROCEDURE — 80048 BASIC METABOLIC PNL TOTAL CA: CPT

## 2024-05-08 PROCEDURE — 6370000000 HC RX 637 (ALT 250 FOR IP): Performed by: STUDENT IN AN ORGANIZED HEALTH CARE EDUCATION/TRAINING PROGRAM

## 2024-05-08 PROCEDURE — 93010 ELECTROCARDIOGRAM REPORT: CPT | Performed by: INTERNAL MEDICINE

## 2024-05-08 RX ADMIN — ACETYLCYSTEINE 600 MG: 200 SOLUTION ORAL; RESPIRATORY (INHALATION) at 07:43

## 2024-05-08 RX ADMIN — ASPIRIN 81 MG: 81 TABLET, CHEWABLE ORAL at 09:31

## 2024-05-08 RX ADMIN — DILTIAZEM HYDROCHLORIDE 30 MG: 30 TABLET, FILM COATED ORAL at 06:00

## 2024-05-08 RX ADMIN — ALBUTEROL SULFATE 2 PUFF: 90 AEROSOL, METERED RESPIRATORY (INHALATION) at 07:39

## 2024-05-08 RX ADMIN — PANTOPRAZOLE SODIUM 40 MG: 40 TABLET, DELAYED RELEASE ORAL at 06:00

## 2024-05-08 RX ADMIN — DILTIAZEM HYDROCHLORIDE 30 MG: 30 TABLET, FILM COATED ORAL at 13:01

## 2024-05-08 RX ADMIN — GUAIFENESIN 600 MG: 600 TABLET, EXTENDED RELEASE ORAL at 09:31

## 2024-05-08 RX ADMIN — ALBUTEROL SULFATE 2 PUFF: 90 AEROSOL, METERED RESPIRATORY (INHALATION) at 11:29

## 2024-05-08 RX ADMIN — APIXABAN 5 MG: 5 TABLET, FILM COATED ORAL at 06:00

## 2024-05-08 RX ADMIN — METOPROLOL TARTRATE 25 MG: 25 TABLET, FILM COATED ORAL at 09:31

## 2024-05-08 RX ADMIN — CEFTRIAXONE SODIUM 2000 MG: 2 INJECTION, POWDER, FOR SOLUTION INTRAMUSCULAR; INTRAVENOUS at 09:41

## 2024-05-08 RX ADMIN — OXYCODONE HYDROCHLORIDE 10 MG: 10 TABLET ORAL at 10:15

## 2024-05-08 ASSESSMENT — PAIN DESCRIPTION - LOCATION
LOCATION: BACK

## 2024-05-08 ASSESSMENT — PAIN SCALES - GENERAL
PAINLEVEL_OUTOF10: 0
PAINLEVEL_OUTOF10: 5
PAINLEVEL_OUTOF10: 3

## 2024-05-08 ASSESSMENT — PAIN DESCRIPTION - PAIN TYPE
TYPE: CHRONIC PAIN
TYPE: CHRONIC PAIN

## 2024-05-08 ASSESSMENT — PAIN DESCRIPTION - ORIENTATION
ORIENTATION: LOWER

## 2024-05-08 ASSESSMENT — PAIN DESCRIPTION - FREQUENCY
FREQUENCY: CONTINUOUS
FREQUENCY: CONTINUOUS

## 2024-05-08 ASSESSMENT — PAIN SCALES - WONG BAKER
WONGBAKER_NUMERICALRESPONSE: NO HURT

## 2024-05-08 ASSESSMENT — PAIN DESCRIPTION - DESCRIPTORS
DESCRIPTORS: STABBING
DESCRIPTORS: ACHING

## 2024-05-08 ASSESSMENT — PAIN DESCRIPTION - ONSET
ONSET: ON-GOING
ONSET: ON-GOING

## 2024-05-08 NOTE — DISCHARGE SUMMARY
influenzae pneumonia and moderate COPD exacerbation  -CTA chest abdomen reviewed, no acute PE, multifocal airspace disease w/ bronchial wall thickening, large penetrating ulcer/pseudoaneurysm of aortic arch, extensive 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 Procal 14.14. Negative respiratory viral panel.  Resp cx growing haemophilus influenzae   Completed steroids course  Now requiring 2L NC daily (baseline prior was room air during day) and 4L/min with activity  Change IV Rocephin  to Po augmentin   Counseled on tobacco cessation     Afib with RVR-resolved  went into afib with RVR on 5/5,   HR now controlled.  On oral diltiazem, metoprolol and apixaban      Squamous cell lung cancer  -just diagnosed, has not started treatment yet   - f/u outpatient     Acute kidney injury  -FENa 0.4%, prerenal due to poor appetite vs hemodynamic variability due to above  -No obstructive uropathy on imaging.  UA with small blood/ 30 protein.    -Received 2.2 L NS in ER  Cr today 1.4 (trending downward)  Nephro consulted on admission     Elevated troponin 173> 145. Demand ischemia secondary to above  -CAD status post 3V CABG  Continue aspirin and Crestor      Moderate to severe MR  -Found on echo 5/6  Cardiology consulted, recommending follow-up as outpatient with [facility] cardiology and no indication for surgical repair or endovascular repair     TAA s/p TEVAR      Two areas of large penetrating ulcers in proximal Descendin Thoracic Aorta, and infrarenal AAA  -surgery was offered by Dr. Davis in [place], but she declined     Extensive thrombus in infrarenal AAA with aneurysm 4.6 cm  -vascular surgery consult this admission appreciated, repeat CT and office follow up recommended in 1 month    Patient stable enough to be discharged home today. She will need 2L/min O2 at rest and 4L/min with activity.     The patient expressed appropriate understanding of and agreement with the discharge recommendations, medications, and

## 2024-05-08 NOTE — PROGRESS NOTES
5/7/2024 11:01 AM  Patient Room #: 3124/3124-A  Patient Name: Mamta Cobian    (Step 1 Done by RN if possible otherwise call Pulmonary Diagnostics)  Place patient on room air at rest for at least 30 minutes.  If patient falls below 88% before 30 minutes then you can record the level and stop.  Record room air saturation level __ %.  If patient is at 88% or below, they will qualify for home oxygen and you can stop.  If level does not fall below 88%, fill in level above. If indicated continue to Step 2.   Signature:_____ Date: ___  (Step 2&3 Done by [facility])  Ambulate patient on room air until saturation falls below 89%.  Record level of room air saturation with ambulation___ %.  Next, place patient back on ___lpm oxygen and ambulate, record level __%.  (Note:  this level must show improvement from room air level done with ambulation.)  If patient’s saturation on room air with ambulation is 88% or below AND patient shows improvement with oxygen during ambulation, they will qualify for home oxygen and you can stop.  If patient does not drop below 89%, then patient should have an overnight oximetry trending on room air to see if level falls below 88%.  Complete level in Step 3 below.    Room air overnight oximetry level 88 % for___  cumulative minutes.  If patient’s room air oxygen level is below <89% for any amount of time they will qualify for nocturnal home oxygen.        (Attach Night Trending Report)    Complete order below: Diagnosis:_COPD, CAD__  Home oxygen at:  Length of Need: ?X Lifetime ? 3 Months     ___lpm or __%   via  [] nasal cannula  []mask  [] other         []continuous []  with activity  []  Nocturnal   [] Portable Tanks []  Concentrator  [] Conserving Device        Therapist Signature:_______     Date:  ___  Physician Signature:  __Electronically Signed in EMR_    Date:___  Physician Printed Name:  ___Juancho Rubin MD  NPI:  [id number] ___    [] Patient Qualifies      [] Patient 
    V2.0    [facility] [place] Progress Note      Name:  Mamta Cobian /Age/Sex: 1945  (78 y.o. female)   MRN & CSN:  [id number] & [id number] Encounter Date/Time: 2024 9:09 AM EDT   Location:  [address] [place] [address]-A PCP: No primary care provider on file.     Attending:Michael Bond MD       Hospital Day: 4    Assessment and Recommendations   Mamta Cobian is a 78 y.o. female     Chief Complaint   Patient presents with    Fatigue    Weight Loss     Loss of appetite     May 6  -Had A-fib with RVR early this morning, and was on Cardizem drip  -Placed order to wean the drip off, and to start oral Cardizem  -She does have a prior history of A-fib  -Consider cardiology consult  -Therapy recommended acute rehab unit, but patient declines to go there  -Remains on 4 L oxygen and does have home oxygen, but uses it only at night  -Consulted pulmonary  -Possible discharge home in a couple days if her respiratory status improves, if her heart rate remains stable, and if she continues to decline to be admitted to the acute rehab unit    Severe Sepsis POA  -Acute on chronic Respiratory Failure  -Secondary to multifocal Haemophilus influenzae pneumonia and moderate COPD exacerbation  -Tobacco use  -CTA chest abdomen reviewed, no acute PE, multifocal airspace disease w/ bronchial wall thickening, large penetrating ulcer/pseudoaneurysm of aortic arch, extensive 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 Procal 14.14. Negative respiratory viral panel.  IV Rocephin and doxycycline - continue both abx  Scheduled DuoNebs and prednisone  Counseled on tobacco cessation    Squamous cell lung cancer  -just diagnosed, has not started treatment yet     Acute kidney injury  -FENa 0.4%, prerenal due to poor appetite vs hemodynamic variability due to above  -No obstructive uropathy on imaging.  UA with small blood/ 30 protein.    -Received 2.2 L NS in ER  ER provider discussed with nephrology     Elevated troponin 173> 145. Demand ischemia 
    V2.0    [facility] [place]      Name:  Mamta Cobian /Age/Sex: 1945  (78 y.o. female)   MRN & CSN:  [id number] & [id number] Encounter Date/Time: 5/3/2024 9:09 AM EDT   Location:  [address] [place] [address]-A PCP: No primary care provider on file.     Attending:Michael Bond MD       Hospital Day: 2    Assessment and Recommendations   Mamta Cobian is a 78 y.o. 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 Procal 14.14. Negative respiratory viral panel.  IV Rocephin and doxycycline - continue both abx  Follow-up blood and sputum culture, urine antigens  Scheduled DuoNebs and prednisone  Counseled on tobacco cessation     Acute kidney injury  FENa 0.4%, prerenal due to poor appetite vs hemodynamic variability due to above  No obstructive uropathy on imaging.  UA with small blood/ 30 protein.    Received 2.2 L NS in ER  Further management based on repeat metabolic panel  ER provider discussed with nephrology, will evaluate in a.m.     Elevated troponin 173> 145. 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     Paroxysmal A-fib  Continue home Lopressor  Continue Eliquis for AC, continue current dose due to presence of mural thrombus     Metastatic squamous cell lung carcinoma  Recently diagnosed undergoing further workup to initiate 
    V2.0    [facility] [place] Progress Note      Name:  Mamta Cobian /Age/Sex: 1945  (78 y.o. female)   MRN & CSN:  [id number] & [id number] Encounter Date/Time: 2024 9:09 AM EDT   [facility]  [facility] PCP: No primary care provider on file.     Attending:Tulio Bermudez MD       Hospital Day: 6    Subjective:     Chief Complaint:  SOB    Pt still requires 2L NC at rest and up to 4L on short distance ambulation. Pt was also very SOB with minimal walking.   Has cough but not bringing much sputum.    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 Procal 14.14. Negative respiratory viral panel.  Resp cx growing haemophilus influenzae   Completed steroids course  Still on 2L NC (baseline room air during day), worse w ambulation up to 4L w tachypnea.  Continue IV Rocephin    Scheduled DuoNebs  Add acapalla, IS, chest physiotherapy, Mucomyst  Counseled on tobacco cessation    Afib with RVR-resolved  went into afib with RVR on ,   HR now controlled.  started on oral diltiazem  Continue home moetporlol and Eliquis    Squamous cell lung cancer  -just diagnosed, has not started treatment yet   - f/u outpatient    Acute kidney injury  -FENa 0.4%, prerenal due to poor appetite vs hemodynamic variability due to above  -No obstructive uropathy on imaging.  UA with small blood/ 30 protein.    -Received 2.2 L NS in ER  Cr today 1.7 (trending downward)  Nephro consulted on admission     Elevated troponin 173> 145. Demand ischemia secondary to above  -CAD status post 3V CABG  Continue aspirin and Crestor     Moderate to severe MR  -Found on echo   Cardiology consulted, 
   05/04/24 0843   Oxygen Therapy/Pulse Ox   O2 Flow Rate (L/min) (S)  2 L/min   Pulse 66   Respirations 16   SpO2 96 %     Patient wears 2L nocturnally at home.  
   05/07/24 1050   Encounter Summary   Encounter Overview/Reason Initial Encounter   Service Provided For Patient   Referral/Consult From [facility]   Support System Family members   Last Encounter  05/07/24  (Referral visit. This provided follow up on a consult for spiritual care.)   Complexity of Encounter Low   Begin Time 1048   End Time  1100   Total Time Calculated 12 min   Spiritual/Emotional needs   Type Spiritual Support   Grief, Loss, and Adjustments   Type Adjustment to illness   Assessment/Intervention/Outcome   Assessment Coping   Intervention Active listening;Empowerment;Sustaining Presence/Ministry of presence   Outcome Coping;Encouraged;Engaged in conversation;Expressed Gratitude;Optimistic   Plan and Referrals   Plan/Referrals Continue Support (comment)       
  Nephrology Progress Note  5/4/2024 9:37 AM  Subjective:     Interval History: Mamta Cobian is a 78 y.o. female with doing okay overall somewhat weak and tired more interactive today less short of breath still requiring oxygen        Data:   Scheduled Meds:   citalopram  20 mg Oral Nightly    rosuvastatin  20 mg Oral Nightly    apixaban  5 mg Oral BID    aspirin  81 mg Oral Daily    [Held by provider] lisinopril  20 mg Oral Daily    metoprolol tartrate  25 mg Oral BID    pantoprazole  40 mg Oral Daily    predniSONE  40 mg Oral Daily    cefTRIAXone (ROCEPHIN) IV  2,000 mg IntraVENous Q24H    doxycycline  100 mg Oral 2 times per day    ipratropium 0.5 mg-albuterol 2.5 mg  1 Dose Inhalation Q4H WA RT     Continuous Infusions:      CBC   Recent Labs     05/02/24 1834 05/03/24 0441 05/04/24  0335   WBC 14.8* 10.4 11.9*   HGB 11.4* 9.8* 10.4*   HCT 37.5 33.6* 34.1*    181 193      BMP   Recent Labs     05/02/24 1834 05/03/24 0441 05/04/24  0335   * 132* 135   K 4.2 4.9 4.7   CL 92* 97* 100   CO2 22 21 18*   PHOS  --   --  5.1*   BUN 49* 49* 55*   CREATININE 3.9* 4.0* 3.9*     Hepatic:   Recent Labs     05/02/24 1834 05/04/24  0335   AST 20 30   ALT 6* 7*   BILITOT 0.6 0.2   ALKPHOS 105 105     Troponin: No results for input(s): \"TROPONINI\" in the last 72 hours.  BNP: No results for input(s): \"BNP\" in the last 72 hours.  Lipids: No results for input(s): \"CHOL\", \"HDL\" in the last 72 hours.    Invalid input(s): \"LDLCALCU\"  ABGs: No results found for: \"PHART\", \"PO2ART\", \"TRB1OJO\"  INR:   Recent Labs     05/02/24 1834   INR 1.5     Renal Labs  Albumin:  No results found for: \"LABALBU\"  Calcium:    Lab Results   Component Value Date/Time    CALCIUM 8.1 05/04/2024 03:35 AM     Phosphorus:    Lab Results   Component Value Date/Time    PHOS 5.1 05/04/2024 03:35 AM     U/A:    Lab Results   Component Value Date/Time    NITRU NEGATIVE 05/02/2024 09:55 PM    COLORU YELLOW 05/02/2024 09:55 PM    PHUR 5.0 
  Nephrology Progress Note  5/5/2024 9:48 AM  Subjective:     Interval History: Mamta Cobian is a 78 y.o. female appears doing somewhat better today no acute distress        Data:   Scheduled Meds:   polyethylene glycol  17 g Oral Daily    citalopram  20 mg Oral Nightly    rosuvastatin  20 mg Oral Nightly    apixaban  5 mg Oral BID    aspirin  81 mg Oral Daily    metoprolol tartrate  25 mg Oral BID    pantoprazole  40 mg Oral Daily    predniSONE  40 mg Oral Daily    cefTRIAXone (ROCEPHIN) IV  2,000 mg IntraVENous Q24H    doxycycline  100 mg Oral 2 times per day    ipratropium 0.5 mg-albuterol 2.5 mg  1 Dose Inhalation Q4H WA RT     Continuous Infusions:   dilTIAZem 15 mg/hr (05/05/24 0937)    sodium chloride 50 mL/hr at 05/05/24 0800         CBC   Recent Labs     05/03/24  0441 05/04/24  0335 05/05/24  0143   WBC 10.4 11.9* 12.5*   HGB 9.8* 10.4* 10.1*   HCT 33.6* 34.1* 33.0*    193 244      BMP   Recent Labs     05/03/24  0441 05/04/24  0335 05/05/24  0143   * 135 140   K 4.9 4.7 4.5   CL 97* 100 105   CO2 21 18* 22   PHOS  --  5.1* 3.7   BUN 49* 55* 52*   CREATININE 4.0* 3.9* 3.1*     Hepatic:   Recent Labs     05/02/24  1834 05/04/24  0335   AST 20 30   ALT 6* 7*   BILITOT 0.6 0.2   ALKPHOS 105 105     Troponin: No results for input(s): \"TROPONINI\" in the last 72 hours.  BNP: No results for input(s): \"BNP\" in the last 72 hours.  Lipids: No results for input(s): \"CHOL\", \"HDL\" in the last 72 hours.    Invalid input(s): \"LDLCALCU\"  ABGs: No results found for: \"PHART\", \"PO2ART\", \"SIB1BVX\"  INR:   Recent Labs     05/02/24 1834   INR 1.5     Renal Labs  Albumin:  No results found for: \"LABALBU\"  Calcium:    Lab Results   Component Value Date/Time    CALCIUM 8.4 05/05/2024 01:43 AM     Phosphorus:    Lab Results   Component Value Date/Time    PHOS 3.7 05/05/2024 01:43 AM     U/A:    Lab Results   Component Value Date/Time    NITRU NEGATIVE 05/02/2024 09:55 PM    COLORU YELLOW 05/02/2024 09:55 PM    PHUR 
  Physical Therapy Treatment Note  Name: Mamta Cobian MRN: [id number] :   1945   Date:  2024   Admission Date: 2024 Room:  [address] [place] [address]-   Restrictions/Precautions:          4L O2 via NC  Communication with other providers: handoff from RN  Subjective:  Patient states:  Feeling OK, reports increased back pain today but has improved. Agreeable to PT.   Pain: 5/10 low back   Objective:    Observation:    Treatment, including education/measures:  Bed mobility - SUP with HOB elevated  Transfer - sit to stand from EOB, CGA  Transfer - toilet, ambulatory with rollator, SUP. Pt manages gown and pericare. VC for washing hands  Gait - ambulates 90' with rollator, CG. Reciprocal pattern, downward gaze, rounded shoulders  Assessment / Impression:    Pt demonstrating improved balance with ambulation, no posterior LOB.   Patient's tolerance of treatment:  Fair   Barriers to improvement: Balance, fatigue, pain  Plan for Next Session:    Balance, endurance with gait  Time in:  1458  Time out:  1510  Timed treatment minutes:  12  Total treatment time:  12    Previously filed items:  Social/Functional History  Lives With: Other (comment) (Sister)  Type of Home: House  Home Layout: One level  Home Access: Level entry  Home Equipment: Cane, Walker, rolling, Oxygen (PRN use of either device on \"bad days\"; 2L O2 at night)  Has the patient had two or more falls in the past year or any fall with injury in the past year?: No  ADL Assistance: Independent  Homemaking Assistance: Independent  Homemaking Responsibilities: Yes  Ambulation Assistance: Needs assistance (PRN assist from sister on \"bad days\")  Transfer Assistance: Independent        Short Term Goals  Time Frame for Short Term Goals: 1 week  Short Term Goal 1: pt to complete all bed mobility mod I  Short Term Goal 2: pt to complete all STS transfers to/from bed, commode, and chair SBA  Short Term Goal 3: pt to ambulate 50' with LRAD CGA    Electronically signed by:  
4 Eyes Skin Assessment     NAME:  Mamta Cobian  YOB: 1945  MEDICAL RECORD NUMBER:  [id number]    The patient is being assessed for  Admission    I agree that at least one RN has performed a thorough Head to Toe Skin Assessment on the patient. ALL assessment sites listed below have been assessed.      Areas assessed by both nurses:    Head, Face, Ears, Shoulders, Back, Chest, Arms, Elbows, Hands, Sacrum. Buttock, Coccyx, Ischium, Legs. Feet and Heels, and Under Medical Devices         Does the Patient have a Wound? No noted wound(s)       Dusty Prevention initiated by RN: Yes  Wound Care Orders initiated by RN: No    Pressure Injury (Stage 3,4, Unstageable, DTI, NWPT, and Complex wounds) if present, place Wound referral order by RN under : No    New Ostomies, if present place, Ostomy referral order under : No     Nurse 1 eSignature: Electronically signed by CUATE TIRADO RN on 5/3/24 at 12:50 AM EDT    **SHARE this note so that the co-signing nurse can place an eSignature**    Nurse 2 eSignature: Electronically signed by Marylin Kessler RN on 5/3/24 at 1:05 AM EDT    
Nephrology Progress Note  5/6/2024 8:00 AM        Subjective:   Admit Date: 5/2/2024  PCP: No primary care provider on file.    Interval History: Weekend events reviewed, uneventful by the way unless I am not aware of some events    Diet: Reasonable    ROS: No shortness of breath or confusion  Urine output recorded only 400 cc probably not accurately recorded  No fever and acceptable blood pressure    Data:     Current meds:    dilTIAZem  30 mg Oral 4 times per day    albuterol sulfate HFA  2 puff Inhalation Q4H WA RT    And    ipratropium  2 puff Inhalation Q4H WA RT    polyethylene glycol  17 g Oral Daily    citalopram  20 mg Oral Nightly    rosuvastatin  20 mg Oral Nightly    apixaban  5 mg Oral BID    aspirin  81 mg Oral Daily    metoprolol tartrate  25 mg Oral BID    pantoprazole  40 mg Oral Daily    predniSONE  40 mg Oral Daily    cefTRIAXone (ROCEPHIN) IV  2,000 mg IntraVENous Q24H    doxycycline  100 mg Oral 2 times per day           I/O last 3 completed shifts:  In: -   Out: 400 [Urine:400]    CBC:   Recent Labs     05/04/24 0335 05/05/24  0143 05/06/24  0538   WBC 11.9* 12.5* 8.9   HGB 10.4* 10.1* 9.5*    244 217          Recent Labs     05/04/24 0335 05/05/24  0143 05/06/24  0538    140 141   K 4.7 4.5 4.4    105 108   CO2 18* 22 23   BUN 55* 52* 34*   CREATININE 3.9* 3.1* 1.9*   GLUCOSE 112* 81 86       Lab Results   Component Value Date    CALCIUM 8.3 05/06/2024    PHOS 3.4 05/06/2024       Objective:     Vitals: BP (!) 143/96   Pulse 61   Temp 98.1 °F (36.7 °C) (Oral)   Resp 17   Ht 1.651 m (5' 5\")   Wt 76 kg (167 lb 8.8 oz)   SpO2 99%   BMI 27.88 kg/m² ,    General appearance: Thin, alert, awake and oriented  HEENT: No gross conjunctival pallor  Neck: Supple with supplemental oxygen via nasal cannula  Lungs: Coarse breath sound  Heart: Seemed regular rate and rhythm-well-healed incision from previous sternotomy  Abdomen: Soft  Extremities: No gross edema      Problem List : 
Nephrology Progress Note  5/7/2024 8:59 AM        Subjective:   Admit Date: 5/2/2024  PCP: No primary care provider on file.    Interval History:  patient seen in early morning, this late entry   improving no major event overnight    Diet:  reasonable    ROS:   no shortness of breath or confusion   ironically some of the blood pressure reading is rather high, as she was admitted with very low blood pressure, no fever now   urine output was not recorded      Data:     Current meds:    guaiFENesin  600 mg Oral BID    dilTIAZem  30 mg Oral 4 times per day    albuterol sulfate HFA  2 puff Inhalation Q4H WA RT    And    ipratropium  2 puff Inhalation Q4H WA RT    polyethylene glycol  17 g Oral Daily    citalopram  20 mg Oral Nightly    rosuvastatin  20 mg Oral Nightly    apixaban  5 mg Oral BID    aspirin  81 mg Oral Daily    metoprolol tartrate  25 mg Oral BID    pantoprazole  40 mg Oral Daily    predniSONE  40 mg Oral Daily    cefTRIAXone (ROCEPHIN) IV  2,000 mg IntraVENous Q24H           I/O last 3 completed shifts:  In: 190 [P.O.:190]  Out: -     CBC:   Recent Labs     05/05/24  0143 05/06/24  0538   WBC 12.5* 8.9   HGB 10.1* 9.5*    217          Recent Labs     05/05/24  0143 05/06/24  0538 05/07/24  0150    141 140   K 4.5 4.4 4.7    108 106   CO2 22 23 22   BUN 52* 34* 31*   CREATININE 3.1* 1.9* 1.7*   GLUCOSE 81 86 112*       Lab Results   Component Value Date    CALCIUM 9.0 05/07/2024    PHOS 3.4 05/07/2024       Objective:     Vitals: BP (!) 144/66   Pulse 54   Temp 97.4 °F (36.3 °C) (Oral)   Resp 13   Ht 1.651 m (5' 5\")   Wt 78.9 kg (173 lb 15.1 oz)   SpO2 96%   BMI 28.95 kg/m² ,    General appearance:   thin, alert, awake and oriented  HEENT:   1+ conjunctival pallor  Neck:   seems supple central oxygen via nasal cannula  Lungs:   coarse breath sound but no gross crackles  Heart:   regular rate and rhythm with systolic ejection murmur- well-healed incision from previous 
Nephrology Progress Note  5/8/2024 10:33 AM        Subjective:   Admit Date: 5/2/2024  PCP: No primary care provider on file.    Interval History: Patient seen in early morning, this is a late entry  No major event that I am aware of    Diet: Reasonable    ROS: No shortness of breath or confusion, urine output was not recorded, no fever and acceptable blood pressure    Data:     Current meds:    acetylcysteine  600 mg Inhalation BID RT    guaiFENesin  600 mg Oral BID    dilTIAZem  30 mg Oral 4 times per day    albuterol sulfate HFA  2 puff Inhalation Q4H WA RT    And    ipratropium  2 puff Inhalation Q4H WA RT    polyethylene glycol  17 g Oral Daily    citalopram  20 mg Oral Nightly    rosuvastatin  20 mg Oral Nightly    apixaban  5 mg Oral BID    aspirin  81 mg Oral Daily    metoprolol tartrate  25 mg Oral BID    pantoprazole  40 mg Oral Daily    cefTRIAXone (ROCEPHIN) IV  2,000 mg IntraVENous Q24H           I/O last 3 completed shifts:  In: 480 [P.O.:480]  Out: -     CBC:   Recent Labs     05/06/24  0538   WBC 8.9   HGB 9.5*             Recent Labs     05/06/24  0538 05/07/24  0150 05/08/24  0423    140 142   K 4.4 4.7 4.5    106 107   CO2 23 22 25   BUN 34* 31* 26*   CREATININE 1.9* 1.7* 1.4*   GLUCOSE 86 112* 95       Lab Results   Component Value Date    CALCIUM 8.7 05/08/2024    PHOS 3.4 05/07/2024       Objective:     Vitals: BP (!) 147/67   Pulse 73   Temp 97 °F (36.1 °C) (Oral)   Resp 22   Ht 1.651 m (5' 5\")   Wt 79 kg (174 lb 2.6 oz)   SpO2 97%   BMI 28.98 kg/m² ,    General appearance: Alert, awake and oriented  HEENT: Positive conjunctival pallor  Neck: Supple with supplemental oxygen via nasal cannula  Lungs: Few rhonchi no gross crackles, mild expiratory wheeze  Heart: Seemed regular rate and rhythm-well-healed incision from previous sternotomy  Abdomen: Soft  Extremities: No overt leg edema      Problem List :         Impression :     Stage III acute kidney injury-likely 
Occupational Therapy    Occupational Therapy Treatment Note    Name: Mamta Cobian MRN: [id number] :   1945   Date:  2024   Admission Date: 2024 Room:  [address] [place] [address]-A     Primary Problem:  The primary encounter diagnosis was Sepsis with acute hypoxic respiratory failure without septic shock, due to unspecified organism (HCC).     Restrictions/Precautions:General precautions; Fall risk     Communication with other providers: RN approved session.    Subjective:  Patient states:  Pt agreeable to therapy session.   Pain:   Location, Type, Intensity (0/10 to 10/10):  denies pain    Objective:    Observation: Pt supine in bed upon arrival.   Objective Measures:  Pt alert and oriented.     Treatment, including education:  Therapeutic Activity Training:   Therapeutic activity training was instructed today.  Cues were given for safety, sequence, UE/LE placement, awareness, and balance.    Activities performed today included bed mobility training, sup-sit, sit-stand, SPT.    Pt supine in bed upon arrival and completed sup to sit with head of bed elevated with SBA. Pt completed scooting to edge of bed with SBA. Pt completed sit to stand from edge of bed with WW and gait belt donned and completed sit to stand and CGA. Pt completed functional mobility in hallway with close SBA and noted to destat to 88% on 2L and increased to 89 on 3L and destat to 86% and increased to 4L did not drop below 91% on 4L NC RN aware and approved of increased. Pt completed functional mobility household distance and returned supine in bed with SBA. Pt needs met and call light in reach and RN approved oxygen on 2L.    Assessment / Impression:    Patient's tolerance of treatment: Well  Adverse Reaction: None  Significant change in status and impact: Improved from initial evaluation  Barriers to improvement: None noted      Plan for Next Session:    Continue OT POC    Time in:  1343  Time out:  1358  Timed treatment minutes:  15  Total 
Occupational Therapy  .  Occupational Therapy Treatment Note    Name: Mamta Cobian MRN: [id number] :   1945   Date:  2024   Admission Date: 2024 Room:  [address] [place] [address]-A     Primary Problem:  The primary encounter diagnosis was Sepsis with acute hypoxic respiratory failure without septic shock, due to unspecified organism (HCC). Diagnoses of SHAUNNA (acute kidney injury) (HCC) and Abdominal aortic aneurysm (AAA) without rupture, unspecified part (HCC) were also pertinent to this visit.     Restrictions/Precautions:          General precautions; Fall risk    Communication with other providers: Per chart review, patient is appropriate for therapeutic intervention. Nurse Russel notified of pt's O2 desats c activity and mobility, nurse reports aware.       Subjective:  Patient states:  \"I don't think I need therapy before I go home.\" Pt educated for rationale for therapeutic intervention, including at hospital inpatient level, and was agreeable.   Pain:   Location, Type, Intensity (0/10 to 10/10):  0/10, denies pain    Objective:    Observation: Pt received in semi-fowlers in bed, A&O x4, actively participated c SBA to CGA during functional transfers. Pt exhibits O2 desats into upper 80's c activity and mobility, requires mod to max cues for rest breaks and PLB techniques to manage recovery so can continue participation.    Objective Measures:  Telemetry, HR 60, O2 2L NC c O2 sats ranging 87-96%, requiring cues and rest breaks as noted for recovery.    Treatment, including education:  Therapeutic Activity Training:   Therapeutic activity training was instructed today.  Cues were given for safety, sequence, UE/LE placement, awareness, and balance.    Activities performed today included bed mobility training, sup-sit, sit-stand, SPT.    Supine<>sit: SBA  Scooting: SBA  Sit<>stands: Intermittent CGA c Rollator + min vc's for safe body positioning    Standing balance / tolerance: SBA static, intermittent CGA c Rollator 
Occupational Therapy  [facility] ACUTE CARE OCCUPATIONAL THERAPY EVALUATION    Mamta Cobian, 1945, 3124/3124-A, 5/3/2024    Discharge Recommendation: Encourage facility for intensive rehabilitation, anticipate 3 hours per day and 5 days per week.      History:  [place]:  The primary encounter diagnosis was Sepsis with acute hypoxic respiratory failure without septic shock, due to unspecified organism (HCC). Diagnoses of SHAUNNA (acute kidney injury) ([facility]) and Abdominal aortic aneurysm (AAA) without rupture, unspecified part ([facility]) were also pertinent to this visit.  No past medical history on file.      Subjective:  Patient states: \"Whoa I'm wobbly\"  Pain:  denies  Communication with other providers: co-eval w/ PT, handoff to RN  Restrictions: General Precautions, Fall Risk    Home Setup/Prior level of function:  Social/Functional History  Lives With: Other (comment) (Sister)  Type of Home: House  Home Layout: One level  Home Access: Level entry  Home Equipment: Cane, Walker, rolling, Oxygen (PRN use of either device on \"bad days\"; 2L O2 at night)  Has the patient had two or more falls in the past year or any fall with injury in the past year?: No  ADL Assistance: Independent  Homemaking Assistance: Independent  Homemaking Responsibilities: Yes  Ambulation Assistance: Needs assistance (PRN assist from sister on \"bad days\")  Transfer Assistance: Independent     Examination:  Observation: Supine in bed upon arrival, agreeable to therapy eval.  Vision: WFL  Hearing: WFL  Vitals: Stable vitals throughout session on 4L O2      Body Systems and functions:  ROM: WFL   Strength: B UE grossly 4-/5 across all major joints   Sensation: WFL  Tone: Normal  Coordination: WFL  Perception: WNL      Cognitive and Psychosocial Functioning:  Overall cognitive status: alert, oriented x3. Intermittent confusion throughout session, decreased safety awareness, limited insight into deficits. Follows commands w/o 
Outpatient Pharmacy Progress Note for Meds-to-Beds    Total number of Prescriptions Filled: 3    Additional Documentation:  Patient picked-up the medication(s) in the OP Pharmacy      Thank you for letting us serve your patients.  [address] [address]    Phone: [phone]    Fax: [phone]        
Pt walked comfortably on 4 L maintaining 92%. Pt ambulated about 40 ft. Pt was \"wore out\", post walk.   
Pulmonary and Critical Care  Progress Note    Subjective:   The patient has improved  Shortness of breath has improved  Chest pain none.  Addressing respiratory complaints Patient is negative for  hemoptysis and cyanosis  CONSTITUTIONAL:  negative for fevers and chills      Past Medical History:     has no past medical history on file.   has no past surgical history on file.   reports that she has been smoking cigarettes. She has never used smokeless tobacco. She reports that she does not currently use alcohol. She reports that she does not currently use drugs.  Family history:  family history is not on file.    Allergies   Allergen Reactions    Latex Rash     Social History:    Reviewed; no changes    Objective:   PHYSICAL EXAM:        VITALS:  BP (!) 144/66   Pulse 54   Temp 97.4 °F (36.3 °C) (Oral)   Resp 16   Ht 1.651 m (5' 5\")   Wt 78.9 kg (173 lb 15.1 oz)   SpO2 96%   BMI 28.95 kg/m²     24HR INTAKE/OUTPUT:  No intake or output data in the 24 hours ending 05/07/24 1049    CONSTITUTIONAL:  awake, alert, cooperative, no apparent distress, and appears stated age  LUNGS:  decreased breath sounds, occ basilar crackles.  CARDIOVASCULAR:  normal S1 and S2 and negative JVD  ABD:Abdomen soft, non-tender. BS normal. No masses,  No organomegaly  NEURO:Alert and oriented x3. Gait normal. Reflexes and motor strength normal and symmetric. Cranial nerves 2-12 and sensation grossly intact.  DATA:    CBC:  Recent Labs     05/05/24  0143 05/06/24  0538   WBC 12.5* 8.9   RBC 3.73* 3.50*   HGB 10.1* 9.5*   HCT 33.0* 31.9*    217   MCV 88.5 91.1   MCH 27.1 27.1   MCHC 30.6* 29.8*   RDW 15.0* 15.1*      BMP:  Recent Labs     05/05/24  0143 05/06/24  0538 05/07/24  0150    141 140   K 4.5 4.4 4.7    108 106   CO2 22 23 22   BUN 52* 34* 31*   CREATININE 3.1* 1.9* 1.7*   CALCIUM 8.4 8.3 9.0   GLUCOSE 81 86 112*      ABG:  No results for input(s): \"PH\", \"PO2ART\", \"ZHQ8OOJ\", \"HCO3\", \"BEART\", \"O2SAT\" in the last 
Pulmonary and Critical Care  Progress Note    Subjective:   The patient is better.On 2 L N/C.  Shortness of breath has improved.  Chest pain none.  Addressing respiratory complaints Patient is negative for  hemoptysis and cyanosis  CONSTITUTIONAL:  negative for fevers and chills      Past Medical History:     has no past medical history on file.   has no past surgical history on file.   reports that she has been smoking cigarettes. She has never used smokeless tobacco. She reports that she does not currently use alcohol. She reports that she does not currently use drugs.  Family history:  family history is not on file.    Allergies   Allergen Reactions    Latex Rash     Social History:    Reviewed; no changes    Objective:   PHYSICAL EXAM:        VITALS:  BP (!) 147/67   Pulse 73   Temp 97 °F (36.1 °C) (Oral)   Resp 22   Ht 1.651 m (5' 5\")   Wt 79 kg (174 lb 2.6 oz)   SpO2 97%   BMI 28.98 kg/m²     24HR INTAKE/OUTPUT:    Intake/Output Summary (Last 24 hours) at 5/8/2024 1056  Last data filed at 5/8/2024 0924  Gross per 24 hour   Intake 720 ml   Output --   Net 720 ml       CONSTITUTIONAL:  awake, alert, cooperative, no apparent distress, and appears stated age  LUNGS:  decreased breath sounds, occ basilar crackles.  CARDIOVASCULAR:  normal S1 and S2 and negative JVD  ABD:Abdomen soft, non-tender. BS normal. No masses,  No organomegaly  NEURO:Alert and oriented x3. Gait normal. Reflexes and motor strength normal and symmetric. Cranial nerves 2-12 and sensation grossly intact.  DATA:    CBC:  Recent Labs     05/06/24  0538   WBC 8.9   RBC 3.50*   HGB 9.5*   HCT 31.9*      MCV 91.1   MCH 27.1   MCHC 29.8*   RDW 15.1*      BMP:  Recent Labs     05/06/24  0538 05/07/24  0150 05/08/24  0423    140 142   K 4.4 4.7 4.5    106 107   CO2 23 22 25   BUN 34* 31* 26*   CREATININE 1.9* 1.7* 1.4*   CALCIUM 8.3 9.0 8.7   GLUCOSE 86 112* 95      ABG:  No results for input(s): \"PH\", \"PO2ART\", \"TXH3UST\", 
thickening and mucous plugging in the lower lung. Additional patchy branching airspace disease in the left lower lung. Findings most consistent with multifocal bilateral pneumonia. There is a nodular opacity in the anterior segment of the right upper lobe axial image 130 of series 603 suspicious for possible neoplasm versus focal area of pneumonia. This measures 2.1 x 1.9 cm. Consider further evaluation with bronchoscopy and/or PET/CT. Mediastinum: Numerous small lymph nodes in the mediastinum. Enlarged right hilar lymph node axial image 56 measuring 1.6 x 1.5 cm. Inferior right hilar lymph node image 69 measures 1.5 cm in diameter. Vasculature: Along the aortic arch along the lateral aspect on image 39 of series 303 and identified on coronal image 47 of series 604 there appears to be a large penetrating ulcer or pseudoaneurysm extending laterally and inferiorly towards the AP window region. 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 Vascular grafts appear well opacified. Ascending thoracic aorta is normal in caliber. Tortuous thoracic aorta. 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 The excluded aneurysm on image 91 measures 5.6 x 5.6 cm in maximum diameter. Lower Neck and Chest Wall: No chest wall abnormality. No supraclavicular lymphadenopathy. Small lymph nodes in the upper mediastinum. Bones: No acute osseous abnormality. ABDOMEN AND PELVIS: Upper Abdomen: No suspicious liver lesion identified. Granulomatous calcifications of the spleen. Cholelithiasis layering in the gallbladder. The gallbladder is mildly distended. Equivocal gallbladder wall thickening. No acute pancreatic abnormality identified. Vasculature: Extensive irregularity of the abdominal aorta. 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 Superior mesenteric artery origin appears widely patent. Good 
05/02/2024 09:55 PM    CLARITYU CLOUDY 05/02/2024 09:55 PM    LEUKOCYTESUR TRACE 05/02/2024 09:55 PM    UROBILINOGEN 1.0 05/02/2024 09:55 PM    BILIRUBINUR NEGATIVE 05/02/2024 09:55 PM    BLOODU SMALL NUMBER OR AMOUNT OBSERVED 05/02/2024 09:55 PM    GLUCOSEU NEGATIVE 05/02/2024 09:55 PM    KETUA NEGATIVE 05/02/2024 09:55 PM     Urine Cultures: No results found for: \"LABURIN\"  Blood Cultures: No results found for: \"BC\"  No results found for: \"BLOODCULT2\"  Organism: No results found for: \"ORG\"      Electronically signed by Alice Arauz MD on 5/6/2024 at 8:11 AM

## 2024-05-08 NOTE — PLAN OF CARE
Problem: Discharge Planning  Goal: Discharge to home or other facility with appropriate resources  Outcome: Completed     Problem: Pain  Goal: Verbalizes/displays adequate comfort level or baseline comfort level  Outcome: Completed     Problem: Safety - Adult  Goal: Free from fall injury  Outcome: Completed     Problem: Respiratory - Adult  Goal: Achieves optimal ventilation and oxygenation  Outcome: Completed     Problem: Cardiovascular - Adult  Goal: Maintains optimal cardiac output and hemodynamic stability  Outcome: Completed     Problem: Musculoskeletal - Adult  Goal: Return mobility to safest level of function  Outcome: Completed

## 2024-05-10 PROBLEM — I77.1 CELIAC ARTERY STENOSIS (HCC): Status: ACTIVE | Noted: 2024-05-10

## 2024-05-24 ENCOUNTER — TELEPHONE (OUTPATIENT)
Dept: CARDIOTHORACIC SURGERY | Age: 79
End: 2024-05-24

## 2024-06-03 ENCOUNTER — TELEPHONE (OUTPATIENT)
Dept: CARDIOTHORACIC SURGERY | Age: 79
End: 2024-06-03

## 2024-06-03 NOTE — TELEPHONE ENCOUNTER
LM again for pt to return call to reschedule today's 6/3/24 appt due to provider being called to emergent surgery.

## 2024-08-21 ENCOUNTER — HOSPITAL ENCOUNTER (EMERGENCY)
Age: 79
Discharge: HOME | End: 2024-08-21
Payer: MEDICARE

## 2024-08-21 VITALS
RESPIRATION RATE: 16 BRPM | DIASTOLIC BLOOD PRESSURE: 70 MMHG | OXYGEN SATURATION: 97 % | TEMPERATURE: 97 F | HEART RATE: 89 BPM | SYSTOLIC BLOOD PRESSURE: 147 MMHG

## 2024-08-21 DIAGNOSIS — S91.311A FOOT LACERATION, RIGHT, INITIAL ENCOUNTER: Primary | ICD-10-CM

## 2024-08-21 PROCEDURE — 99283 EMERGENCY DEPT VISIT LOW MDM: CPT

## 2024-08-21 NOTE — ED TRIAGE NOTES
Patient to ED with complaints of laceration to toe. States she stepped on something last night. Bleeding controlled. Home health care sent her in

## 2024-08-21 NOTE — ED PROVIDER NOTES
MG tablet Take 1 tablet by mouth dailyHistorical Med      fluticasone-umeclidin-vilant (TRELEGY ELLIPTA) 100-62.5-25 MCG/ACT AEPB inhaler Inhale 1 puff into the lungs dailyHistorical Med      ipratropium 0.5 mg-albuterol 2.5 mg (DUONEB) 0.5-2.5 (3) MG/3ML SOLN nebulizer solution Inhale 3 mLs into the lungs every 4 hoursHistorical Med      metoprolol tartrate (LOPRESSOR) 25 MG tablet Take 1 tablet by mouth 2 times dailyHistorical Med      oxyCODONE (ROXICODONE) 5 MG immediate release tablet Take 1 tablet by mouth every 4 hours as needed for Pain (1-2 TABS Q4-6HRS PRN). Max Daily Amount: 30 mgHistorical Med      pantoprazole (PROTONIX) 40 MG tablet Take 1 tablet by mouth dailyHistorical Med      rosuvastatin (CRESTOR) 20 MG tablet Take 1 tablet by mouth dailyHistorical Med      tiZANidine (ZANAFLEX) 4 MG tablet Take 1 tablet by mouth as needed (AT BEDTIME)Historical Med      traZODone (DESYREL) 150 MG tablet Take 1 tablet by mouth nightlyHistorical Med      albuterol sulfate HFA (VENTOLIN HFA) 108 (90 Base) MCG/ACT inhaler Inhale 2 puffs into the lungs every 4 hours as needed for Wheezing or Shortness of BreathHistorical Med             ALLERGIES     Latex    FAMILYHISTORY     No family history on file.     SOCIAL HISTORY       Social History     Socioeconomic History    Marital status:    Tobacco Use    Smoking status: Every Day     Types: Cigarettes    Smokeless tobacco: Never   Substance and Sexual Activity    Alcohol use: Not Currently    Drug use: Not Currently     Social Determinants of Health     Food Insecurity: No Food Insecurity (5/3/2024)    Hunger Vital Sign     Worried About Running Out of Food in the Last Year: Never true     Ran Out of Food in the Last Year: Never true   Transportation Needs: No Transportation Needs (5/3/2024)    PRAPARE - Transportation     Lack of Transportation (Medical): No     Lack of Transportation (Non-Medical): No   Housing Stability: Low Risk  (5/3/2024)    Housing